# Patient Record
Sex: MALE | Race: WHITE | NOT HISPANIC OR LATINO
[De-identification: names, ages, dates, MRNs, and addresses within clinical notes are randomized per-mention and may not be internally consistent; named-entity substitution may affect disease eponyms.]

---

## 2018-03-13 PROBLEM — Z00.00 ENCOUNTER FOR PREVENTIVE HEALTH EXAMINATION: Status: ACTIVE | Noted: 2018-03-13

## 2018-08-29 ENCOUNTER — APPOINTMENT (OUTPATIENT)
Dept: SURGERY | Facility: CLINIC | Age: 63
End: 2018-08-29
Payer: COMMERCIAL

## 2018-08-29 VITALS
OXYGEN SATURATION: 100 % | WEIGHT: 190 LBS | SYSTOLIC BLOOD PRESSURE: 156 MMHG | BODY MASS INDEX: 27.2 KG/M2 | RESPIRATION RATE: 15 BRPM | TEMPERATURE: 97.3 F | HEART RATE: 65 BPM | DIASTOLIC BLOOD PRESSURE: 91 MMHG | HEIGHT: 70 IN

## 2018-08-29 DIAGNOSIS — Z87.19 OTHER SPECIFIED POSTPROCEDURAL STATES: ICD-10-CM

## 2018-08-29 DIAGNOSIS — I10 ESSENTIAL (PRIMARY) HYPERTENSION: ICD-10-CM

## 2018-08-29 DIAGNOSIS — Z78.9 OTHER SPECIFIED HEALTH STATUS: ICD-10-CM

## 2018-08-29 DIAGNOSIS — K60.3 ANAL FISTULA: ICD-10-CM

## 2018-08-29 DIAGNOSIS — Z86.010 PERSONAL HISTORY OF COLONIC POLYPS: ICD-10-CM

## 2018-08-29 DIAGNOSIS — H40.9 UNSPECIFIED GLAUCOMA: ICD-10-CM

## 2018-08-29 DIAGNOSIS — Z98.890 OTHER SPECIFIED POSTPROCEDURAL STATES: ICD-10-CM

## 2018-08-29 DIAGNOSIS — K40.20 BILATERAL INGUINAL HERNIA, W/OUT OBSTRUCTION OR GANGRENE, NOT SPECIFIED AS RECURRENT: ICD-10-CM

## 2018-08-29 DIAGNOSIS — F41.9 ANXIETY DISORDER, UNSPECIFIED: ICD-10-CM

## 2018-08-29 DIAGNOSIS — Z80.0 FAMILY HISTORY OF MALIGNANT NEOPLASM OF DIGESTIVE ORGANS: ICD-10-CM

## 2018-08-29 DIAGNOSIS — M48.9 SPONDYLOPATHY, UNSPECIFIED: ICD-10-CM

## 2018-08-29 DIAGNOSIS — K52.9 NONINFECTIVE GASTROENTERITIS AND COLITIS, UNSPECIFIED: ICD-10-CM

## 2018-08-29 DIAGNOSIS — N40.0 BENIGN PROSTATIC HYPERPLASIA WITHOUT LOWER URINARY TRACT SYMPMS: ICD-10-CM

## 2018-08-29 PROCEDURE — 99214 OFFICE O/P EST MOD 30 MIN: CPT

## 2018-08-29 RX ORDER — AMOXICILLIN AND CLAVULANATE POTASSIUM 875; 125 MG/1; MG/1
875-125 TABLET, COATED ORAL
Refills: 0 | Status: DISCONTINUED | COMMUNITY
End: 2018-08-29

## 2018-10-15 ENCOUNTER — RESULT REVIEW (OUTPATIENT)
Age: 63
End: 2018-10-15

## 2018-10-15 ENCOUNTER — OUTPATIENT (OUTPATIENT)
Dept: OUTPATIENT SERVICES | Facility: HOSPITAL | Age: 63
LOS: 1 days | End: 2018-10-15
Payer: COMMERCIAL

## 2018-10-15 ENCOUNTER — APPOINTMENT (OUTPATIENT)
Dept: SURGERY | Facility: HOSPITAL | Age: 63
End: 2018-10-15
Payer: COMMERCIAL

## 2018-10-15 DIAGNOSIS — K52.9 NONINFECTIVE GASTROENTERITIS AND COLITIS, UNSPECIFIED: ICD-10-CM

## 2018-10-15 PROCEDURE — 88305 TISSUE EXAM BY PATHOLOGIST: CPT

## 2018-10-15 PROCEDURE — 45380 COLONOSCOPY AND BIOPSY: CPT

## 2018-10-15 PROCEDURE — 88305 TISSUE EXAM BY PATHOLOGIST: CPT | Mod: 26

## 2018-10-16 LAB — SURGICAL PATHOLOGY STUDY: SIGNIFICANT CHANGE UP

## 2023-10-30 DIAGNOSIS — Z86.010 PERSONAL HISTORY OF COLONIC POLYPS: ICD-10-CM

## 2023-10-31 DIAGNOSIS — Z12.11 ENCOUNTER FOR SCREENING FOR MALIGNANT NEOPLASM OF COLON: ICD-10-CM

## 2023-12-04 ENCOUNTER — INPATIENT (INPATIENT)
Facility: HOSPITAL | Age: 68
LOS: 1 days | Discharge: HOME | DRG: 871 | End: 2023-12-06
Attending: INTERNAL MEDICINE | Admitting: INTERNAL MEDICINE
Payer: COMMERCIAL

## 2023-12-04 ENCOUNTER — RESULT REVIEW (OUTPATIENT)
Age: 68
End: 2023-12-04

## 2023-12-04 ENCOUNTER — TRANSCRIPTION ENCOUNTER (OUTPATIENT)
Age: 68
End: 2023-12-04

## 2023-12-04 ENCOUNTER — APPOINTMENT (OUTPATIENT)
Dept: SURGERY | Facility: HOSPITAL | Age: 68
End: 2023-12-04
Payer: COMMERCIAL

## 2023-12-04 VITALS
HEART RATE: 76 BPM | RESPIRATION RATE: 18 BRPM | WEIGHT: 179.9 LBS | DIASTOLIC BLOOD PRESSURE: 77 MMHG | HEIGHT: 70 IN | SYSTOLIC BLOOD PRESSURE: 137 MMHG | TEMPERATURE: 98 F | OXYGEN SATURATION: 93 %

## 2023-12-04 DIAGNOSIS — Z86.010 PERSONAL HISTORY OF COLONIC POLYPS: ICD-10-CM

## 2023-12-04 DIAGNOSIS — J69.0 PNEUMONITIS DUE TO INHALATION OF FOOD AND VOMIT: ICD-10-CM

## 2023-12-04 DIAGNOSIS — J18.9 PNEUMONIA, UNSPECIFIED ORGANISM: ICD-10-CM

## 2023-12-04 DIAGNOSIS — I10 ESSENTIAL (PRIMARY) HYPERTENSION: ICD-10-CM

## 2023-12-04 DIAGNOSIS — Z98.890 OTHER SPECIFIED POSTPROCEDURAL STATES: Chronic | ICD-10-CM

## 2023-12-04 LAB
ALBUMIN SERPL ELPH-MCNC: 4 G/DL — SIGNIFICANT CHANGE UP (ref 3.3–5)
ALBUMIN SERPL ELPH-MCNC: 4 G/DL — SIGNIFICANT CHANGE UP (ref 3.3–5)
ALP SERPL-CCNC: 50 U/L — SIGNIFICANT CHANGE UP (ref 40–120)
ALP SERPL-CCNC: 50 U/L — SIGNIFICANT CHANGE UP (ref 40–120)
ALT FLD-CCNC: 37 U/L — SIGNIFICANT CHANGE UP (ref 10–45)
ALT FLD-CCNC: 37 U/L — SIGNIFICANT CHANGE UP (ref 10–45)
ANION GAP SERPL CALC-SCNC: 11 MMOL/L — SIGNIFICANT CHANGE UP (ref 5–17)
ANION GAP SERPL CALC-SCNC: 11 MMOL/L — SIGNIFICANT CHANGE UP (ref 5–17)
AST SERPL-CCNC: 28 U/L — SIGNIFICANT CHANGE UP (ref 10–40)
AST SERPL-CCNC: 28 U/L — SIGNIFICANT CHANGE UP (ref 10–40)
BILIRUB SERPL-MCNC: 0.5 MG/DL — SIGNIFICANT CHANGE UP (ref 0.2–1.2)
BILIRUB SERPL-MCNC: 0.5 MG/DL — SIGNIFICANT CHANGE UP (ref 0.2–1.2)
BUN SERPL-MCNC: 14 MG/DL — SIGNIFICANT CHANGE UP (ref 7–23)
BUN SERPL-MCNC: 14 MG/DL — SIGNIFICANT CHANGE UP (ref 7–23)
CALCIUM SERPL-MCNC: 10.1 MG/DL — SIGNIFICANT CHANGE UP (ref 8.4–10.5)
CALCIUM SERPL-MCNC: 10.1 MG/DL — SIGNIFICANT CHANGE UP (ref 8.4–10.5)
CHLORIDE SERPL-SCNC: 104 MMOL/L — SIGNIFICANT CHANGE UP (ref 96–108)
CHLORIDE SERPL-SCNC: 104 MMOL/L — SIGNIFICANT CHANGE UP (ref 96–108)
CO2 SERPL-SCNC: 26 MMOL/L — SIGNIFICANT CHANGE UP (ref 22–31)
CO2 SERPL-SCNC: 26 MMOL/L — SIGNIFICANT CHANGE UP (ref 22–31)
CREAT SERPL-MCNC: 0.85 MG/DL — SIGNIFICANT CHANGE UP (ref 0.5–1.3)
CREAT SERPL-MCNC: 0.85 MG/DL — SIGNIFICANT CHANGE UP (ref 0.5–1.3)
EGFR: 95 ML/MIN/1.73M2 — SIGNIFICANT CHANGE UP
EGFR: 95 ML/MIN/1.73M2 — SIGNIFICANT CHANGE UP
GLUCOSE SERPL-MCNC: 123 MG/DL — HIGH (ref 70–99)
GLUCOSE SERPL-MCNC: 123 MG/DL — HIGH (ref 70–99)
HCT VFR BLD CALC: 43.4 % — SIGNIFICANT CHANGE UP (ref 39–50)
HCT VFR BLD CALC: 43.4 % — SIGNIFICANT CHANGE UP (ref 39–50)
HGB BLD-MCNC: 14.5 G/DL — SIGNIFICANT CHANGE UP (ref 13–17)
HGB BLD-MCNC: 14.5 G/DL — SIGNIFICANT CHANGE UP (ref 13–17)
MCHC RBC-ENTMCNC: 30 PG — SIGNIFICANT CHANGE UP (ref 27–34)
MCHC RBC-ENTMCNC: 30 PG — SIGNIFICANT CHANGE UP (ref 27–34)
MCHC RBC-ENTMCNC: 33.4 GM/DL — SIGNIFICANT CHANGE UP (ref 32–36)
MCHC RBC-ENTMCNC: 33.4 GM/DL — SIGNIFICANT CHANGE UP (ref 32–36)
MCV RBC AUTO: 89.7 FL — SIGNIFICANT CHANGE UP (ref 80–100)
MCV RBC AUTO: 89.7 FL — SIGNIFICANT CHANGE UP (ref 80–100)
NRBC # BLD: 0 /100 WBCS — SIGNIFICANT CHANGE UP (ref 0–0)
NRBC # BLD: 0 /100 WBCS — SIGNIFICANT CHANGE UP (ref 0–0)
PLATELET # BLD AUTO: 219 K/UL — SIGNIFICANT CHANGE UP (ref 150–400)
PLATELET # BLD AUTO: 219 K/UL — SIGNIFICANT CHANGE UP (ref 150–400)
POTASSIUM SERPL-MCNC: 3.9 MMOL/L — SIGNIFICANT CHANGE UP (ref 3.5–5.3)
POTASSIUM SERPL-MCNC: 3.9 MMOL/L — SIGNIFICANT CHANGE UP (ref 3.5–5.3)
POTASSIUM SERPL-SCNC: 3.9 MMOL/L — SIGNIFICANT CHANGE UP (ref 3.5–5.3)
POTASSIUM SERPL-SCNC: 3.9 MMOL/L — SIGNIFICANT CHANGE UP (ref 3.5–5.3)
PROT SERPL-MCNC: 7 G/DL — SIGNIFICANT CHANGE UP (ref 6–8.3)
PROT SERPL-MCNC: 7 G/DL — SIGNIFICANT CHANGE UP (ref 6–8.3)
RBC # BLD: 4.84 M/UL — SIGNIFICANT CHANGE UP (ref 4.2–5.8)
RBC # BLD: 4.84 M/UL — SIGNIFICANT CHANGE UP (ref 4.2–5.8)
RBC # FLD: 12.4 % — SIGNIFICANT CHANGE UP (ref 10.3–14.5)
RBC # FLD: 12.4 % — SIGNIFICANT CHANGE UP (ref 10.3–14.5)
SODIUM SERPL-SCNC: 141 MMOL/L — SIGNIFICANT CHANGE UP (ref 135–145)
SODIUM SERPL-SCNC: 141 MMOL/L — SIGNIFICANT CHANGE UP (ref 135–145)
WBC # BLD: 14.54 K/UL — HIGH (ref 3.8–10.5)
WBC # BLD: 14.54 K/UL — HIGH (ref 3.8–10.5)
WBC # FLD AUTO: 14.54 K/UL — HIGH (ref 3.8–10.5)
WBC # FLD AUTO: 14.54 K/UL — HIGH (ref 3.8–10.5)

## 2023-12-04 PROCEDURE — 71045 X-RAY EXAM CHEST 1 VIEW: CPT | Mod: 26

## 2023-12-04 PROCEDURE — 88305 TISSUE EXAM BY PATHOLOGIST: CPT | Mod: 26

## 2023-12-04 PROCEDURE — 45385 COLONOSCOPY W/LESION REMOVAL: CPT

## 2023-12-04 RX ORDER — SODIUM CHLORIDE 9 MG/ML
500 INJECTION INTRAMUSCULAR; INTRAVENOUS; SUBCUTANEOUS
Refills: 0 | Status: COMPLETED | OUTPATIENT
Start: 2023-12-04 | End: 2023-12-04

## 2023-12-04 RX ORDER — PANTOPRAZOLE SODIUM 20 MG/1
40 TABLET, DELAYED RELEASE ORAL ONCE
Refills: 0 | Status: COMPLETED | OUTPATIENT
Start: 2023-12-04 | End: 2023-12-04

## 2023-12-04 RX ORDER — PIPERACILLIN AND TAZOBACTAM 4; .5 G/20ML; G/20ML
3.38 INJECTION, POWDER, LYOPHILIZED, FOR SOLUTION INTRAVENOUS ONCE
Refills: 0 | Status: COMPLETED | OUTPATIENT
Start: 2023-12-05 | End: 2023-12-05

## 2023-12-04 RX ORDER — AMLODIPINE BESYLATE 2.5 MG/1
5 TABLET ORAL DAILY
Refills: 0 | Status: DISCONTINUED | OUTPATIENT
Start: 2023-12-04 | End: 2023-12-06

## 2023-12-04 RX ORDER — PIPERACILLIN AND TAZOBACTAM 4; .5 G/20ML; G/20ML
3.38 INJECTION, POWDER, LYOPHILIZED, FOR SOLUTION INTRAVENOUS ONCE
Refills: 0 | Status: COMPLETED | OUTPATIENT
Start: 2023-12-04 | End: 2023-12-04

## 2023-12-04 RX ORDER — PIPERACILLIN AND TAZOBACTAM 4; .5 G/20ML; G/20ML
3.38 INJECTION, POWDER, LYOPHILIZED, FOR SOLUTION INTRAVENOUS EVERY 8 HOURS
Refills: 0 | Status: DISCONTINUED | OUTPATIENT
Start: 2023-12-05 | End: 2023-12-06

## 2023-12-04 RX ORDER — PANTOPRAZOLE SODIUM 20 MG/1
40 TABLET, DELAYED RELEASE ORAL
Refills: 0 | Status: DISCONTINUED | OUTPATIENT
Start: 2023-12-04 | End: 2023-12-06

## 2023-12-04 RX ADMIN — PIPERACILLIN AND TAZOBACTAM 25 GRAM(S): 4; .5 INJECTION, POWDER, LYOPHILIZED, FOR SOLUTION INTRAVENOUS at 17:52

## 2023-12-04 RX ADMIN — SODIUM CHLORIDE 30 MILLILITER(S): 9 INJECTION INTRAMUSCULAR; INTRAVENOUS; SUBCUTANEOUS at 08:59

## 2023-12-04 RX ADMIN — PIPERACILLIN AND TAZOBACTAM 200 GRAM(S): 4; .5 INJECTION, POWDER, LYOPHILIZED, FOR SOLUTION INTRAVENOUS at 16:52

## 2023-12-04 RX ADMIN — PANTOPRAZOLE SODIUM 40 MILLIGRAM(S): 20 TABLET, DELAYED RELEASE ORAL at 13:58

## 2023-12-04 RX ADMIN — PANTOPRAZOLE SODIUM 40 MILLIGRAM(S): 20 TABLET, DELAYED RELEASE ORAL at 17:52

## 2023-12-04 NOTE — ASU DISCHARGE PLAN (ADULT/PEDIATRIC) - NS MD DC FALL RISK RISK
For information on Fall & Injury Prevention, visit: [email or web address] OR  [email or web address] OR  https://www.cdc.gov/steadi/patient.html For information on Fall & Injury Prevention, visit: [email or web address]://[email or web address] OR  [email or web address]://[email or web address]/[email or web address]-injury OR  https://www.cdc.gov/steadi/patient.html

## 2023-12-04 NOTE — PRE PROCEDURE NOTE - PRE PROCEDURE EVALUATION
Attending Physician:           Jerome                 Procedure: colonoscopy    Indication for Procedure: screening  ________________________________________________________  PAST MEDICAL & SURGICAL HISTORY:  Hypertension      H/O hernia repair        ALLERGIES:  No Known Allergies    HOME MEDICATIONS:  amLODIPine 5 mg oral tablet: 1 tab(s) orally once a day    AICD/PPM: [ ] yes   [ ] no    PERTINENT LAB DATA:                      PHYSICAL EXAMINATION:    Height (cm): 177.8  Weight (kg): 81.6  BMI (kg/m2): 25.8  BSA (m2): 2T(C): 36.8  HR: 76  BP: 137/77  RR: 18  SpO2: 93%    Constitutional: NAD  HEENT: PERRLA, EOMI,    Neck:  No JVD  Respiratory: CTAB/L  Cardiovascular: S1 and S2  Gastrointestinal: BS+, soft, NT/ND  Extremities: No peripheral edema  Neurological: A/O x 3, no focal deficits  Psychiatric: Normal mood, normal affect  Skin: No rashes    ASA Class: I [ ]  II [ ]  III [ ]  IV [ ]    COMMENTS:    The patient is a suitable candidate for the planned procedure unless box checked [ ]  No, explain:

## 2023-12-04 NOTE — H&P ADULT - NSICDXFAMILYHX_GEN_ALL_CORE_FT
FAMILY HISTORY:  Father  Still living? Unknown  Family hx of colon cancer, Age at diagnosis: Age Unknown

## 2023-12-04 NOTE — PATIENT PROFILE ADULT - FALL HARM RISK - HARM RISK INTERVENTIONS
Assistance OOB with selected safe patient handling equipment/Communicate Risk of Fall with Harm to all staff/Discuss with provider need for PT consult/Monitor gait and stability/Provide patient with walking aids - walker, cane, crutches/Reinforce activity limits and safety measures with patient and family/Tailored Fall Risk Interventions/Visual Cue: Yellow wristband and red socks/Bed in lowest position, wheels locked, appropriate side rails in place/Call bell, personal items and telephone in reach/Instruct patient to call for assistance before getting out of bed or chair/Non-slip footwear when patient is out of bed/[place] to call system/Physically safe environment - no spills, clutter or unnecessary equipment/Purposeful Proactive Rounding/Room/bathroom lighting operational, light cord in reach Assistance OOB with selected safe patient handling equipment/Communicate Risk of Fall with Harm to all staff/Discuss with provider need for PT consult/Monitor gait and stability/Provide patient with walking aids - walker, cane, crutches/Reinforce activity limits and safety measures with patient and family/Tailored Fall Risk Interventions/Visual Cue: Yellow wristband and red socks/Bed in lowest position, wheels locked, appropriate side rails in place/Call bell, personal items and telephone in reach/Instruct patient to call for assistance before getting out of bed or chair/Non-slip footwear when patient is out of bed/[place] to call system/Physically safe environment - no spills, clutter or unnecessary equipment/Purposeful Proactive Rounding/Room/bathroom lighting operational, light cord in reach

## 2023-12-04 NOTE — H&P ADULT - PROBLEM SELECTOR PLAN 1
with heart rate > 90 and WBC 14K source pneumonia  continue O2 as needed  piperacillin/tazobactam empiric  blood cultures testing  Tylenol for fever  clears

## 2023-12-04 NOTE — H&P ADULT - NSHPREVIEWOFSYSTEMS_GEN_ALL_CORE
Gen: no loss of wt no loss of appetite  ENT: no dizziness no hearing loss  Ophth: no blurring of vision no loss of vision  Resp: No cough no sputum production  CVS: No chest pain no palpitations no orthopnea  GI: see above HPI   : no dysuria, hematuria  Endo: no polyuria no excessive sweating  Neuro: no weakness no paresthesias  Heme: No petechiae no easy bruising  Msk: No joint pain no swelling  Skin: No rash no itching

## 2023-12-04 NOTE — ASU DISCHARGE PLAN (ADULT/PEDIATRIC) - FOLLOW UP APPOINTMENTS
[facility], [facility], [facility]

## 2023-12-04 NOTE — CHART NOTE - NSCHARTNOTEFT_GEN_A_CORE
after anesthesia, patient has been coughing, shivering, and had one desat episode to 89% briefly requiring o2  c/o cough and feeling "unwell", denies ab pain  afebrile, tachy low 100s, SBP wnl, o2 sat 91-95% on room air  abdominal soft nontender nondistended  CXR L lung possible aspiration  concern for aspiration pneumonitis vs developing pna  not concerned for abdominal issues at this time    appreciate hospitalist Dr. Jad Esparza for evaluating patient  plan to admit to Dr. Esparza medicine service for closer monitoring, pna rx with abx, ppi, +/- CT chest  no acute surgical intervention at this time    discussed with Dr. Jerome [facility]  [id number] in [facility] endo suite, patient has been coughing, shivering, and had one desat episode to 89% briefly requiring o2  c/o cough and feeling "unwell", denies ab pain  afebrile, tachy low 100s, SBP wnl, o2 sat 91-95% on room air  abdominal soft nontender nondistended  CXR L lung possible aspiration (pending final read)  concern for aspiration pneumonitis vs developing pna i/s/o patient was left lateral decubitus for colonoscopy  not concerned for abdominal issues at this time    appreciate hospitalist Dr. Jad Esparza for evaluating patient  plan to admit to Dr. Esparza medicine service for closer monitoring, pna rx with abx, ppi, +/- CT chest  no acute surgical intervention at this time    discussed with Dr. Jeroem [facility]GERY  [id number] in [facility] endo suite, patient has been coughing, shivering, and had one desat episode to 89% briefly requiring o2  c/o cough and feeling "unwell", denies ab pain  afebrile, tachy low 100s, SBP wnl, o2 sat 91-95% on room air  abdominal soft nontender nondistended  CXR L lung possible aspiration (pending final read)  concern for aspiration pneumonitis vs developing pna i/s/o patient was left lateral decubitus for colonoscopy  not concerned for abdominal issues at this time    appreciate hospitalist Dr. Jad Esparza for evaluating patient  plan to admit to Dr. Esparza medicine service for closer monitoring, pna rx with abx, ppi, +/- CT chest  no acute surgical intervention at this time    discussed with Dr. Jerome [facility]  [id number]

## 2023-12-04 NOTE — PACU DISCHARGE NOTE - HYDRATION STATUS:
[facility] Emergency Department    [address]     [place] [address]    Phone:  [phone]    Fax:  Maria Isabel Nolan [address]   MRN: [id number]    Department:  [facility] Emergency Department   Date of Visit:  4/10/20 Medication List      START taking these medications     ondansetron 4 MG Tbdp   Quantity:  10 tablet   Commonly known as:  ZOFRAN-ODT   Take 1 tablet (4 mg total) by mouth every 6 (six) hours as needed for Nausea.             Where to Get Your Medi [facility] Emergency Department. Follow-up care is at the discretion of that Physician.   If you need additional assistance selecting a physician, you may call the [employer] Physician Referral and Class Registration line at [phone] [facility] [address] [address]  [place] [address]) [phone]                Additional Information       We are concerned for your overall well being:    - If you are a smoker or have smoked in the last 12 months, we encourage you to explore op Satisfactory

## 2023-12-04 NOTE — ASU DISCHARGE PLAN (ADULT/PEDIATRIC) - CARE PROVIDER_API CALL
Rommel Cano  Colon/Rectal Surgery  [address] [place] [address], [facility] [address]  [place] [address]  Phone: [phone]  Fax: [phone]  Follow Up Time: Routine   Rommel Cano  Colon/Rectal Surgery  [address] [place] [address], [facility] [address]  [place] [address]  Phone: [phone]  Fax: [phone]  Follow Up Time: Routine

## 2023-12-04 NOTE — H&P ADULT - HISTORY OF PRESENT ILLNESS
68 year male with PMH HTN on amlodipine seen in endoscopy suite after having and episode od hypoxemia following colonoscopy . The patient was stabilized with O2 and is now saturating > 95%. The patient c/o chills and has witnessed episodes of NBNB emesis following the procedure. The patient was admitted with a clinical presentation consistent with likely aspiration pneumonia. No fever documented at the time of my exam. Denied any complaints prior to colonoscopy

## 2023-12-04 NOTE — ASU PATIENT PROFILE, ADULT - FALL HARM RISK - UNIVERSAL INTERVENTIONS
No Bed in lowest position, wheels locked, appropriate side rails in place/Call bell, personal items and telephone in reach/Instruct patient to call for assistance before getting out of bed or chair/Non-slip footwear when patient is out of bed/[place] to call system/Physically safe environment - no spills, clutter or unnecessary equipment/Purposeful Proactive Rounding/Room/bathroom lighting operational, light cord in reach Bed in lowest position, wheels locked, appropriate side rails in place/Call bell, personal items and telephone in reach/Instruct patient to call for assistance before getting out of bed or chair/Non-slip footwear when patient is out of bed/[place] to call system/Physically safe environment - no spills, clutter or unnecessary equipment/Purposeful Proactive Rounding/Room/bathroom lighting operational, light cord in reach

## 2023-12-04 NOTE — H&P ADULT - ASSESSMENT
68 year male with PMH HTN on amlodipine seen in endoscopy suite after having and episode od hypoxemia following colonoscopy clinical presentation consistent with sepsis secondary to  pneumonia

## 2023-12-04 NOTE — H&P ADULT - NSHPLABSRESULTS_GEN_ALL_CORE
noted on [place], including lab results and radiology studies   CXR positive for likely pneumonia left lung noted on [place], including lab results and radiology studies   CXR positive for likely pneumonia left lung

## 2023-12-04 NOTE — H&P ADULT - NSHPPHYSICALEXAM_GEN_ALL_CORE
PHYSICAL EXAM: vital signs noted on [place]  in no apparent distress  HEENT: MARIO ALBERTO EOMI  Neck: Supple, no JVD, no thyromegaly  Lungs: no wheeze, no crackles  CVS: S1 S2 no M/R/G  Abdomen: no tenderness, no organomegaly, BS present  Neuro: AO x 3 no focal weakness, no sensory abnormalities  Psych: appropriate affect  Skin: warm, dry  Ext: no cyanosis or clubbing, no edema  Msk: no joint swelling or deformities  Back: no CVA tenderness, no kyphosis/scoliosis

## 2023-12-04 NOTE — CHART NOTE - NSCHARTNOTEFT_GEN_A_CORE
Post Operative Note  Patient: TORIBIO ECHOLS 68y (1955) Male   MRN: [id number]  Location: [address]  Visit: 12-04-23 Inpatient  Date: 12-04-23 @ 18:26    Procedure: S/P colonoscopy    Subjective: Patient seen and examined post procedure in endoscopy suite. Patient reports that he is feeling "fine".  Denies nausea, vomiting, fever, chills, chest pain, SOB, cough.      Objective:  Vitals: T(F): 99.9 (12-04-23 @ 16:05), Max: 99.9 (12-04-23 @ 16:05)  HR: 101 (12-04-23 @ 16:05)  BP: 126/69 (12-04-23 @ 16:05) (118/71 - 148/75)  RR: 22 (12-04-23 @ 16:05)  SpO2: 92% (12-04-23 @ 16:05)  Vent Settings:       Physical Examination:  General: NAD, resting comfortably in bed  HEENT: Normocephalic atraumatic  Respiratory: Nonlabored respirations, normal CW expansion.  Cardio: S1S2, regular rate and rhythm.  Abdomen: non-distended, soft  Vascular: extremities are warm and well perfused.     Imaging:  Chest X-Ray: There is wedge-shaped opacity left base may represent atelectasis or   	infiltrate.    Assessment:  68yMale patient S/P colonoscopy for sigmoid colitis. Patient experienced coughing and desaturation event to 89% requiring supplemental O2. Chest X-ray concerning for pneumonia vs aspiration pneumonitis. He is now well appearing but still requires supplemental O2 via NC to maintain saturation.     Plan:  - Continuous pulse ox  - Restart home amlodipine  - CLD  - Morning labs  - Rest of care per primary team    Date/Time: 12-04-23 @ 18:26 Post Operative Note  Patient: TORIBIO ECHOLS 68y (1955) Male   MRN: [id number]  Location: [address]  Visit: 12-04-23 Inpatient  Date: 12-04-23 @ 18:26    Procedure: S/P colonoscopy    Subjective: Patient seen and examined post procedure in endoscopy suite. Patient reports that he is feeling "fine".  Denies nausea, vomiting, fever, chills, chest pain, SOB, cough.      Objective:  Vitals: T(F): 99.9 (12-04-23 @ 16:05), Max: 99.9 (12-04-23 @ 16:05)  HR: 101 (12-04-23 @ 16:05)  BP: 126/69 (12-04-23 @ 16:05) (118/71 - 148/75)  RR: 22 (12-04-23 @ 16:05)  SpO2: 92% (12-04-23 @ 16:05)  Vent Settings:       Physical Examination:  General: NAD, resting comfortably in bed  HEENT: Normocephalic atraumatic  Respiratory: Nonlabored respirations, normal CW expansion.  Cardio: S1S2, regular rate and rhythm.  Abdomen: non-distended, soft  Vascular: extremities are warm and well perfused.     Imaging:  Chest X-Ray: There is wedge-shaped opacity left base may represent atelectasis or   	infiltrate.    Assessment:  68yMale patient S/P colonoscopy for sigmoid colitis. Patient experienced coughing and desaturation event to 89% requiring supplemental O2. Chest X-ray concerning for pneumonia vs aspiration pneumonitis. He is now well appearing but still requires supplemental O2 via NC to maintain saturation.     Plan:  - Continuous pulse ox  - Restart home amlodipine  - CLD  - Morning labs  - Rest of care per primary team    Date/Time: 12-04-23 @ 18:26

## 2023-12-04 NOTE — PATIENT PROFILE ADULT - NSTRANSFERBELONGINGSRESP_GEN_A_NUR
Patient care assumed. Report received from day YARITZA [place]. Patient is alert and calm, resting in bed. Call light and bedside table within reach. Will continue to monitor.   yes

## 2023-12-04 NOTE — PACU DISCHARGE NOTE - COMMENTS
Patient okay for discharge to floor on 2L NC.   Patient found to be coughing after procedure with desaturation to low 90's, high 80's,. Patient reports not feeling well.   Chest X-ray concerning for left lower lobe pneumonia. Patient progressed to coughing fits leading to emesis and purulent sputum production.   Decision made to call Medicine Team consult for admission to hospital.

## 2023-12-05 LAB
A1C WITH ESTIMATED AVERAGE GLUCOSE RESULT: 5.3 % — SIGNIFICANT CHANGE UP (ref 4–5.6)
A1C WITH ESTIMATED AVERAGE GLUCOSE RESULT: 5.3 % — SIGNIFICANT CHANGE UP (ref 4–5.6)
ANION GAP SERPL CALC-SCNC: 10 MMOL/L — SIGNIFICANT CHANGE UP (ref 5–17)
ANION GAP SERPL CALC-SCNC: 10 MMOL/L — SIGNIFICANT CHANGE UP (ref 5–17)
BUN SERPL-MCNC: 16 MG/DL — SIGNIFICANT CHANGE UP (ref 7–23)
BUN SERPL-MCNC: 16 MG/DL — SIGNIFICANT CHANGE UP (ref 7–23)
CALCIUM SERPL-MCNC: 10 MG/DL — SIGNIFICANT CHANGE UP (ref 8.4–10.5)
CALCIUM SERPL-MCNC: 10 MG/DL — SIGNIFICANT CHANGE UP (ref 8.4–10.5)
CHLORIDE SERPL-SCNC: 102 MMOL/L — SIGNIFICANT CHANGE UP (ref 96–108)
CHLORIDE SERPL-SCNC: 102 MMOL/L — SIGNIFICANT CHANGE UP (ref 96–108)
CO2 SERPL-SCNC: 25 MMOL/L — SIGNIFICANT CHANGE UP (ref 22–31)
CO2 SERPL-SCNC: 25 MMOL/L — SIGNIFICANT CHANGE UP (ref 22–31)
CREAT SERPL-MCNC: 0.9 MG/DL — SIGNIFICANT CHANGE UP (ref 0.5–1.3)
CREAT SERPL-MCNC: 0.9 MG/DL — SIGNIFICANT CHANGE UP (ref 0.5–1.3)
EGFR: 94 ML/MIN/1.73M2 — SIGNIFICANT CHANGE UP
EGFR: 94 ML/MIN/1.73M2 — SIGNIFICANT CHANGE UP
ESTIMATED AVERAGE GLUCOSE: 105 MG/DL — SIGNIFICANT CHANGE UP (ref 68–114)
ESTIMATED AVERAGE GLUCOSE: 105 MG/DL — SIGNIFICANT CHANGE UP (ref 68–114)
GLUCOSE SERPL-MCNC: 114 MG/DL — HIGH (ref 70–99)
GLUCOSE SERPL-MCNC: 114 MG/DL — HIGH (ref 70–99)
HCT VFR BLD CALC: 40.7 % — SIGNIFICANT CHANGE UP (ref 39–50)
HCT VFR BLD CALC: 40.7 % — SIGNIFICANT CHANGE UP (ref 39–50)
HCV AB S/CO SERPL IA: 0.26 S/CO — SIGNIFICANT CHANGE UP (ref 0–0.99)
HCV AB S/CO SERPL IA: 0.26 S/CO — SIGNIFICANT CHANGE UP (ref 0–0.99)
HCV AB SERPL-IMP: SIGNIFICANT CHANGE UP
HCV AB SERPL-IMP: SIGNIFICANT CHANGE UP
HGB BLD-MCNC: 13.7 G/DL — SIGNIFICANT CHANGE UP (ref 13–17)
HGB BLD-MCNC: 13.7 G/DL — SIGNIFICANT CHANGE UP (ref 13–17)
MCHC RBC-ENTMCNC: 30.3 PG — SIGNIFICANT CHANGE UP (ref 27–34)
MCHC RBC-ENTMCNC: 30.3 PG — SIGNIFICANT CHANGE UP (ref 27–34)
MCHC RBC-ENTMCNC: 33.7 GM/DL — SIGNIFICANT CHANGE UP (ref 32–36)
MCHC RBC-ENTMCNC: 33.7 GM/DL — SIGNIFICANT CHANGE UP (ref 32–36)
MCV RBC AUTO: 90 FL — SIGNIFICANT CHANGE UP (ref 80–100)
MCV RBC AUTO: 90 FL — SIGNIFICANT CHANGE UP (ref 80–100)
NRBC # BLD: 0 /100 WBCS — SIGNIFICANT CHANGE UP (ref 0–0)
NRBC # BLD: 0 /100 WBCS — SIGNIFICANT CHANGE UP (ref 0–0)
PLATELET # BLD AUTO: 211 K/UL — SIGNIFICANT CHANGE UP (ref 150–400)
PLATELET # BLD AUTO: 211 K/UL — SIGNIFICANT CHANGE UP (ref 150–400)
POTASSIUM SERPL-MCNC: 3.9 MMOL/L — SIGNIFICANT CHANGE UP (ref 3.5–5.3)
POTASSIUM SERPL-MCNC: 3.9 MMOL/L — SIGNIFICANT CHANGE UP (ref 3.5–5.3)
POTASSIUM SERPL-SCNC: 3.9 MMOL/L — SIGNIFICANT CHANGE UP (ref 3.5–5.3)
POTASSIUM SERPL-SCNC: 3.9 MMOL/L — SIGNIFICANT CHANGE UP (ref 3.5–5.3)
RBC # BLD: 4.52 M/UL — SIGNIFICANT CHANGE UP (ref 4.2–5.8)
RBC # BLD: 4.52 M/UL — SIGNIFICANT CHANGE UP (ref 4.2–5.8)
RBC # FLD: 12.7 % — SIGNIFICANT CHANGE UP (ref 10.3–14.5)
RBC # FLD: 12.7 % — SIGNIFICANT CHANGE UP (ref 10.3–14.5)
SODIUM SERPL-SCNC: 137 MMOL/L — SIGNIFICANT CHANGE UP (ref 135–145)
SODIUM SERPL-SCNC: 137 MMOL/L — SIGNIFICANT CHANGE UP (ref 135–145)
TSH SERPL-MCNC: 0.24 UIU/ML — LOW (ref 0.27–4.2)
TSH SERPL-MCNC: 0.24 UIU/ML — LOW (ref 0.27–4.2)
WBC # BLD: 16.37 K/UL — HIGH (ref 3.8–10.5)
WBC # BLD: 16.37 K/UL — HIGH (ref 3.8–10.5)
WBC # FLD AUTO: 16.37 K/UL — HIGH (ref 3.8–10.5)
WBC # FLD AUTO: 16.37 K/UL — HIGH (ref 3.8–10.5)

## 2023-12-05 PROCEDURE — 71250 CT THORAX DX C-: CPT | Mod: 26

## 2023-12-05 RX ADMIN — PANTOPRAZOLE SODIUM 40 MILLIGRAM(S): 20 TABLET, DELAYED RELEASE ORAL at 05:21

## 2023-12-05 RX ADMIN — PIPERACILLIN AND TAZOBACTAM 25 GRAM(S): 4; .5 INJECTION, POWDER, LYOPHILIZED, FOR SOLUTION INTRAVENOUS at 09:11

## 2023-12-05 RX ADMIN — AMLODIPINE BESYLATE 5 MILLIGRAM(S): 2.5 TABLET ORAL at 05:22

## 2023-12-05 RX ADMIN — PIPERACILLIN AND TAZOBACTAM 25 GRAM(S): 4; .5 INJECTION, POWDER, LYOPHILIZED, FOR SOLUTION INTRAVENOUS at 02:19

## 2023-12-05 RX ADMIN — PIPERACILLIN AND TAZOBACTAM 25 GRAM(S): 4; .5 INJECTION, POWDER, LYOPHILIZED, FOR SOLUTION INTRAVENOUS at 21:33

## 2023-12-05 RX ADMIN — PANTOPRAZOLE SODIUM 40 MILLIGRAM(S): 20 TABLET, DELAYED RELEASE ORAL at 17:30

## 2023-12-05 NOTE — PROGRESS NOTE ADULT - SUBJECTIVE AND OBJECTIVE BOX
Patient is a 68y old  Male who presents with a chief complaint of chills, nausea, vomiting after colonoscopy (04 Dec 2023 17:24)      DATE OF SERVICE: 12-05-23 @ 14:29    SUBJECTIVE / OVERNIGHT EVENTS: overnight events noted  wife at bedside  "I feel much better"     ROS:  Resp: occasionally cough no sputum production  CVS: No chest pain no palpitations no orthopnea  GI: no N/V/D  : no dysuria, no hematuria  Neuro: no weakness no paresthesias  Heme: No petechiae no easy bruising  Msk: No joint pain no swelling  Skin: No rash no itching        MEDICATIONS  (STANDING):  amLODIPine   Tablet 5 milliGRAM(s) Oral daily  pantoprazole  Injectable 40 milliGRAM(s) IV Push two times a day  piperacillin/tazobactam IVPB.. 3.375 Gram(s) IV Intermittent every 8 hours    MEDICATIONS  (PRN):        CAPILLARY BLOOD GLUCOSE        I&O's Summary      Vital Signs Last 24 Hrs  T(C): 36.7 (05 Dec 2023 13:20), Max: 37.7 (04 Dec 2023 16:05)  T(F): 98.1 (05 Dec 2023 13:20), Max: 99.9 (04 Dec 2023 16:05)  HR: 74 (05 Dec 2023 13:20) (66 - 108)  BP: 112/70 (05 Dec 2023 13:20) (107/67 - 148/75)  BP(mean): --  RR: 18 (05 Dec 2023 13:20) (18 - 22)  SpO2: 93% (05 Dec 2023 13:20) (91% - 95%)    PHYSICAL EXAM:  GENERAL: in no apparent distress  HEAD:  Atraumatic, Normocephalic  EYES: EOMI, PERRLA, sclera clear  NECK: Supple, No JVD  CHEST/LUNG: decreased breath sounds bases   HEART: S1 S2; no murmurs appreciated  ABDOMEN: Soft, Nontender, Bowel sounds present  EXTREMITIES:  No clubbing or cyanosis,  no edema  NEUROLOGY: AO x 3 non-focal  SKIN: No rashes or lesions    LABS:                        13.7   16.37 )-----------( 211      ( 05 Dec 2023 07:13 )             40.7     12-05    137  |  102  |  16  ----------------------------<  114<H>  3.9   |  25  |  0.90    Ca    10.0      05 Dec 2023 07:13    TPro  7.0  /  Alb  4.0  /  TBili  0.5  /  DBili  x   /  AST  28  /  ALT  37  /  AlkPhos  50  12-04          Urinalysis Basic - ( 05 Dec 2023 07:13 )    Color: x / Appearance: x / SG: x / pH: x  Gluc: 114 mg/dL / Ketone: x  / Bili: x / Urobili: x   Blood: x / Protein: x / Nitrite: x   Leuk Esterase: x / RBC: x / WBC x   Sq Epi: x / Non Sq Epi: x / Bacteria: x          All consultant(s) notes reviewed and care discussed with other providers        Contact Number, Dr Esparza [phone] Patient is a 68y old  Male who presents with a chief complaint of chills, nausea, vomiting after colonoscopy (04 Dec 2023 17:24)      DATE OF SERVICE: 12-05-23 @ 14:29    SUBJECTIVE / OVERNIGHT EVENTS: overnight events noted  wife at bedside  "I feel much better"     ROS:  Resp: occasionally cough no sputum production  CVS: No chest pain no palpitations no orthopnea  GI: no N/V/D  : no dysuria, no hematuria  Neuro: no weakness no paresthesias  Heme: No petechiae no easy bruising  Msk: No joint pain no swelling  Skin: No rash no itching        MEDICATIONS  (STANDING):  amLODIPine   Tablet 5 milliGRAM(s) Oral daily  pantoprazole  Injectable 40 milliGRAM(s) IV Push two times a day  piperacillin/tazobactam IVPB.. 3.375 Gram(s) IV Intermittent every 8 hours    MEDICATIONS  (PRN):        CAPILLARY BLOOD GLUCOSE        I&O's Summary      Vital Signs Last 24 Hrs  T(C): 36.7 (05 Dec 2023 13:20), Max: 37.7 (04 Dec 2023 16:05)  T(F): 98.1 (05 Dec 2023 13:20), Max: 99.9 (04 Dec 2023 16:05)  HR: 74 (05 Dec 2023 13:20) (66 - 108)  BP: 112/70 (05 Dec 2023 13:20) (107/67 - 148/75)  BP(mean): --  RR: 18 (05 Dec 2023 13:20) (18 - 22)  SpO2: 93% (05 Dec 2023 13:20) (91% - 95%)    PHYSICAL EXAM:  GENERAL: in no apparent distress  HEAD:  Atraumatic, Normocephalic  EYES: EOMI, PERRLA, sclera clear  NECK: Supple, No JVD  CHEST/LUNG: decreased breath sounds bases   HEART: S1 S2; no murmurs appreciated  ABDOMEN: Soft, Nontender, Bowel sounds present  EXTREMITIES:  No clubbing or cyanosis,  no edema  NEUROLOGY: AO x 3 non-focal  SKIN: No rashes or lesions    LABS:                        13.7   16.37 )-----------( 211      ( 05 Dec 2023 07:13 )             40.7     12-05    137  |  102  |  16  ----------------------------<  114<H>  3.9   |  25  |  0.90    Ca    10.0      05 Dec 2023 07:13    TPro  7.0  /  Alb  4.0  /  TBili  0.5  /  DBili  x   /  AST  28  /  ALT  37  /  AlkPhos  50  12-04          Urinalysis Basic - ( 05 Dec 2023 07:13 )    Color: x / Appearance: x / SG: x / pH: x  Gluc: 114 mg/dL / Ketone: x  / Bili: x / Urobili: x   Blood: x / Protein: x / Nitrite: x   Leuk Esterase: x / RBC: x / WBC x   Sq Epi: x / Non Sq Epi: x / Bacteria: x          All consultant(s) notes reviewed and care discussed with other providers        Contact Number, Dr Esparza [phone]

## 2023-12-05 NOTE — CONSULT NOTE ADULT - ASSESSMENT
68 year male with PMH HTN on amlodipine seen in endoscopy suite after having and episode of hypoxemia following colonoscopy . The patient was stabilized with O2 and is now saturating > 95%. The patient c/o chills and has witnessed episodes of NBNB emesis following the procedure. The patient was admitted with a clinical presentation consistent with likely aspiration pneumonia. No fever documented at the time of my exam. Denied any complaints prior to colonoscopy  (04 Dec 2023 17:24)  today he feels much better:  he has no underlying lung disease and has no hx of smoking   above history noted:    Pneumonia:   HTN      Pneumonia:   -still low grade fever:  wbc increased: likely vomiting induced aspiration:   -CT chest non contrast    HTN  -controlled:    dvt prophylaxis    nicole pmd : nicole wife

## 2023-12-05 NOTE — CONSULT NOTE ADULT - SUBJECTIVE AND OBJECTIVE BOX
12-05-23 @ 16:53    Patient is a 68y old  Male who presents with a chief complaint of chills, nausea, vomiting after colonoscopy (05 Dec 2023 14:28)      HPI:  68 year male with PMH HTN on amlodipine seen in endoscopy suite after having and episode od hypoxemia following colonoscopy . The patient was stabilized with O2 and is now saturating > 95%. The patient c/o chills and has witnessed episodes of NBNB emesis following the procedure. The patient was admitted with a clinical presentation consistent with likely aspiration pneumonia. No fever documented at the time of my exam. Denied any complaints prior to colonoscopy  (04 Dec 2023 17:24)  today he feels much better:  he has no underlying lung disease and has no hx of smoking   above history noted  ?FOLLOWING PRESENT  [x ] Hx of PE/DVT, [ x] Hx COPD, [ x] Hx of Asthma, [ x] Hx of Hospitalization, [ x]  Hx of BiPAP/CPAP use, [x ] Hx of MAYRA    Allergies    No Known Allergies    Intolerances        PAST MEDICAL & SURGICAL HISTORY:  Hypertension      H/O hernia repair          FAMILY HISTORY:  Family hx of colon cancer (Father)        Social History: [ x ] TOBACCO                  [ x ] ETOH                                 [ x ] IVDA/DRUGS    REVIEW OF SYSTEMS      General:	x    Skin/Breast:x  	  Ophthalmologic:x  	  ENMT:	x    Respiratory and Thorax: mild cough   	  Cardiovascular:	x    Gastrointestinal:	nausea . vomiting    Genitourinary:	x    Musculoskeletal:	x    Neurological:	x    Psychiatric:	x    Hematology/Lymphatics:	x    Endocrine:	x    Allergic/Immunologic:	x    MEDICATIONS  (STANDING):  amLODIPine   Tablet 5 milliGRAM(s) Oral daily  pantoprazole  Injectable 40 milliGRAM(s) IV Push two times a day  piperacillin/tazobactam IVPB.. 3.375 Gram(s) IV Intermittent every 8 hours    MEDICATIONS  (PRN):       Vital Signs Last 24 Hrs  T(C): 36.7 (05 Dec 2023 13:20), Max: 37.7 (04 Dec 2023 18:37)  T(F): 98.1 (05 Dec 2023 13:20), Max: 99.9 (04 Dec 2023 18:37)  HR: 74 (05 Dec 2023 13:20) (66 - 104)  BP: 112/70 (05 Dec 2023 13:20) (107/67 - 128/74)  BP(mean): --  RR: 18 (05 Dec 2023 13:20) (18 - 20)  SpO2: 93% (05 Dec 2023 13:20) (92% - 95%)    Parameters below as of 05 Dec 2023 13:20  Patient On (Oxygen Delivery Method): room air    Orthostatic VS          I&O's Summary      Physical Exam:   GENERAL: NAD, well-groomed, well-developed  HEENT: MARIO ALBERTO/   Atraumatic, Normocephalic  ENMT: No tonsillar erythema, exudates, or enlargement; Moist mucous membranes, Good dentition, No lesions  NECK: Supple, No JVD, Normal thyroid  CHEST/LUNG: Clear to auscultation bilaterally  CVS: Regular rate and rhythm; No murmurs, rubs, or gallops  GI: : Soft, Nontender, Nondistended; Bowel sounds present  NERVOUS SYSTEM:  Alert & Oriented X3  EXTREMITIES:  2+ Peripheral Pulses, No clubbing, cyanosis, or edema  LYMPH: No lymphadenopathy noted  SKIN: No rashes or lesions  ENDOCRINOLOGY: No Thyromegaly  PSYCH: Appropriate    Labs:                              13.7   16.37 )-----------( 211      ( 05 Dec 2023 07:13 )             40.7                         14.5   14.54 )-----------( 219      ( 04 Dec 2023 14:30 )             43.4     12-05    137  |  102  |  16  ----------------------------<  114<H>  3.9   |  25  |  0.90  12-04    141  |  104  |  14  ----------------------------<  123<H>  3.9   |  26  |  0.85    Ca    10.0      05 Dec 2023 07:13  Ca    10.1      04 Dec 2023 14:30    TPro  7.0  /  Alb  4.0  /  TBili  0.5  /  DBili  x   /  AST  28  /  ALT  37  /  AlkPhos  50  12-04    CAPILLARY BLOOD GLUCOSE        LIVER FUNCTIONS - ( 04 Dec 2023 14:30 )  Alb: 4.0 g/dL / Pro: 7.0 g/dL / ALK PHOS: 50 U/L / ALT: 37 U/L / AST: 28 U/L / GGT: x             Urinalysis Basic - ( 05 Dec 2023 07:13 )    Color: x / Appearance: x / SG: x / pH: x  Gluc: 114 mg/dL / Ketone: x  / Bili: x / Urobili: x   Blood: x / Protein: x / Nitrite: x   Leuk Esterase: x / RBC: x / WBC x   Sq Epi: x / Non Sq Epi: x / Bacteria: x      D DImer      Studies  Chest X-RAY  CT SCAN Chest   CT Abdomen  Venous Dopplers: LE:   Others      rad< from: Xray Chest 1 View- PORTABLE-Urgent (Xray Chest 1 View- PORTABLE-Urgent .) (12.04.23 @ 14:22) >    ACC: [id number] EXAM:  XR CHEST PORTABLE URGENT 1V   ORDERED BY:  OVIDIO RODRÍGUEZ     PROCEDURE DATE:  12/04/2023          INTERPRETATION:  INDICATION: Coughing and desaturation    Portable chest 12:42 PM    COMPARISON: None    FINDINGS:  Heart/Vascular: The heart size, mediastinum, hilum and aorta are within   normal limits for projection.  Pulmonary: Midline trachea. There is wedge-shaped opacity left base may   represent atelectasis or infiltrate. The right lung is clear. There is no   pulmonary venous congestion.  Bones: There is no fracture.  Lines and catheter: None    Impression:    There is wedge-shaped opacity left base may represent atelectasis or   infiltrate.    --- End of Report ---             APPLE LONG DO; Attending Radiologist  This document has been electronically signed. Dec  4 2023  3:29PM    < end of copied text >                 12-05-23 @ 16:53    Patient is a 68y old  Male who presents with a chief complaint of chills, nausea, vomiting after colonoscopy (05 Dec 2023 14:28)      HPI:  68 year male with PMH HTN on amlodipine seen in endoscopy suite after having and episode od hypoxemia following colonoscopy . The patient was stabilized with O2 and is now saturating > 95%. The patient c/o chills and has witnessed episodes of NBNB emesis following the procedure. The patient was admitted with a clinical presentation consistent with likely aspiration pneumonia. No fever documented at the time of my exam. Denied any complaints prior to colonoscopy  (04 Dec 2023 17:24)  today he feels much better:  he has no underlying lung disease and has no hx of smoking   above history noted  ?FOLLOWING PRESENT  [x ] Hx of PE/DVT, [ x] Hx COPD, [ x] Hx of Asthma, [ x] Hx of Hospitalization, [ x]  Hx of BiPAP/CPAP use, [x ] Hx of MAYRA    Allergies    No Known Allergies    Intolerances        PAST MEDICAL & SURGICAL HISTORY:  Hypertension      H/O hernia repair          FAMILY HISTORY:  Family hx of colon cancer (Father)        Social History: [ x ] TOBACCO                  [ x ] ETOH                                 [ x ] IVDA/DRUGS    REVIEW OF SYSTEMS      General:	x    Skin/Breast:x  	  Ophthalmologic:x  	  ENMT:	x    Respiratory and Thorax: mild cough   	  Cardiovascular:	x    Gastrointestinal:	nausea . vomiting    Genitourinary:	x    Musculoskeletal:	x    Neurological:	x    Psychiatric:	x    Hematology/Lymphatics:	x    Endocrine:	x    Allergic/Immunologic:	x    MEDICATIONS  (STANDING):  amLODIPine   Tablet 5 milliGRAM(s) Oral daily  pantoprazole  Injectable 40 milliGRAM(s) IV Push two times a day  piperacillin/tazobactam IVPB.. 3.375 Gram(s) IV Intermittent every 8 hours    MEDICATIONS  (PRN):       Vital Signs Last 24 Hrs  T(C): 36.7 (05 Dec 2023 13:20), Max: 37.7 (04 Dec 2023 18:37)  T(F): 98.1 (05 Dec 2023 13:20), Max: 99.9 (04 Dec 2023 18:37)  HR: 74 (05 Dec 2023 13:20) (66 - 104)  BP: 112/70 (05 Dec 2023 13:20) (107/67 - 128/74)  BP(mean): --  RR: 18 (05 Dec 2023 13:20) (18 - 20)  SpO2: 93% (05 Dec 2023 13:20) (92% - 95%)    Parameters below as of 05 Dec 2023 13:20  Patient On (Oxygen Delivery Method): room air    Orthostatic VS          I&O's Summary      Physical Exam:   GENERAL: NAD, well-groomed, well-developed  HEENT: MARIO ALBERTO/   Atraumatic, Normocephalic  ENMT: No tonsillar erythema, exudates, or enlargement; Moist mucous membranes, Good dentition, No lesions  NECK: Supple, No JVD, Normal thyroid  CHEST/LUNG: Clear to auscultation bilaterally  CVS: Regular rate and rhythm; No murmurs, rubs, or gallops  GI: : Soft, Nontender, Nondistended; Bowel sounds present  NERVOUS SYSTEM:  Alert & Oriented X3  EXTREMITIES:  2+ Peripheral Pulses, No clubbing, cyanosis, or edema  LYMPH: No lymphadenopathy noted  SKIN: No rashes or lesions  ENDOCRINOLOGY: No Thyromegaly  PSYCH: Appropriate    Labs:                              13.7   16.37 )-----------( 211      ( 05 Dec 2023 07:13 )             40.7                         14.5   14.54 )-----------( 219      ( 04 Dec 2023 14:30 )             43.4     12-05    137  |  102  |  16  ----------------------------<  114<H>  3.9   |  25  |  0.90  12-04    141  |  104  |  14  ----------------------------<  123<H>  3.9   |  26  |  0.85    Ca    10.0      05 Dec 2023 07:13  Ca    10.1      04 Dec 2023 14:30    TPro  7.0  /  Alb  4.0  /  TBili  0.5  /  DBili  x   /  AST  28  /  ALT  37  /  AlkPhos  50  12-04    CAPILLARY BLOOD GLUCOSE        LIVER FUNCTIONS - ( 04 Dec 2023 14:30 )  Alb: 4.0 g/dL / Pro: 7.0 g/dL / ALK PHOS: 50 U/L / ALT: 37 U/L / AST: 28 U/L / GGT: x             Urinalysis Basic - ( 05 Dec 2023 07:13 )    Color: x / Appearance: x / SG: x / pH: x  Gluc: 114 mg/dL / Ketone: x  / Bili: x / Urobili: x   Blood: x / Protein: x / Nitrite: x   Leuk Esterase: x / RBC: x / WBC x   Sq Epi: x / Non Sq Epi: x / Bacteria: x      D DImer      Studies  Chest X-RAY  CT SCAN Chest   CT Abdomen  Venous Dopplers: LE:   Others      rad< from: Xray Chest 1 View- PORTABLE-Urgent (Xray Chest 1 View- PORTABLE-Urgent .) (12.04.23 @ 14:22) >    ACC: [id number] EXAM:  XR CHEST PORTABLE URGENT 1V   ORDERED BY:  OVIDIO RODRÍGUEZ     PROCEDURE DATE:  12/04/2023          INTERPRETATION:  INDICATION: Coughing and desaturation    Portable chest 12:42 PM    COMPARISON: None    FINDINGS:  Heart/Vascular: The heart size, mediastinum, hilum and aorta are within   normal limits for projection.  Pulmonary: Midline trachea. There is wedge-shaped opacity left base may   represent atelectasis or infiltrate. The right lung is clear. There is no   pulmonary venous congestion.  Bones: There is no fracture.  Lines and catheter: None    Impression:    There is wedge-shaped opacity left base may represent atelectasis or   infiltrate.    --- End of Report ---             APPLE LONG DO; Attending Radiologist  This document has been electronically signed. Dec  4 2023  3:29PM    < end of copied text >

## 2023-12-05 NOTE — CONSULT NOTE ADULT - REASON FOR ADMISSION
chills, nausea, vomiting after colonoscopy What Type Of Note Output Would You Prefer (Optional)?: Standard Output What Is The Reason For Today's Visit?: Full Body Skin Examination with No Concerns

## 2023-12-06 ENCOUNTER — TRANSCRIPTION ENCOUNTER (OUTPATIENT)
Age: 68
End: 2023-12-06

## 2023-12-06 VITALS
RESPIRATION RATE: 20 BRPM | OXYGEN SATURATION: 94 % | DIASTOLIC BLOOD PRESSURE: 76 MMHG | HEART RATE: 88 BPM | TEMPERATURE: 98 F | SYSTOLIC BLOOD PRESSURE: 133 MMHG

## 2023-12-06 LAB
HCT VFR BLD CALC: 39.5 % — SIGNIFICANT CHANGE UP (ref 39–50)
HCT VFR BLD CALC: 39.5 % — SIGNIFICANT CHANGE UP (ref 39–50)
HGB BLD-MCNC: 13.4 G/DL — SIGNIFICANT CHANGE UP (ref 13–17)
HGB BLD-MCNC: 13.4 G/DL — SIGNIFICANT CHANGE UP (ref 13–17)
MCHC RBC-ENTMCNC: 30.4 PG — SIGNIFICANT CHANGE UP (ref 27–34)
MCHC RBC-ENTMCNC: 30.4 PG — SIGNIFICANT CHANGE UP (ref 27–34)
MCHC RBC-ENTMCNC: 33.9 GM/DL — SIGNIFICANT CHANGE UP (ref 32–36)
MCHC RBC-ENTMCNC: 33.9 GM/DL — SIGNIFICANT CHANGE UP (ref 32–36)
MCV RBC AUTO: 89.6 FL — SIGNIFICANT CHANGE UP (ref 80–100)
MCV RBC AUTO: 89.6 FL — SIGNIFICANT CHANGE UP (ref 80–100)
NRBC # BLD: 0 /100 WBCS — SIGNIFICANT CHANGE UP (ref 0–0)
NRBC # BLD: 0 /100 WBCS — SIGNIFICANT CHANGE UP (ref 0–0)
PLATELET # BLD AUTO: 209 K/UL — SIGNIFICANT CHANGE UP (ref 150–400)
PLATELET # BLD AUTO: 209 K/UL — SIGNIFICANT CHANGE UP (ref 150–400)
RBC # BLD: 4.41 M/UL — SIGNIFICANT CHANGE UP (ref 4.2–5.8)
RBC # BLD: 4.41 M/UL — SIGNIFICANT CHANGE UP (ref 4.2–5.8)
RBC # FLD: 12.4 % — SIGNIFICANT CHANGE UP (ref 10.3–14.5)
RBC # FLD: 12.4 % — SIGNIFICANT CHANGE UP (ref 10.3–14.5)
WBC # BLD: 15.95 K/UL — HIGH (ref 3.8–10.5)
WBC # BLD: 15.95 K/UL — HIGH (ref 3.8–10.5)
WBC # FLD AUTO: 15.95 K/UL — HIGH (ref 3.8–10.5)
WBC # FLD AUTO: 15.95 K/UL — HIGH (ref 3.8–10.5)

## 2023-12-06 PROCEDURE — 36415 COLL VENOUS BLD VENIPUNCTURE: CPT

## 2023-12-06 PROCEDURE — 85027 COMPLETE CBC AUTOMATED: CPT

## 2023-12-06 PROCEDURE — 83036 HEMOGLOBIN GLYCOSYLATED A1C: CPT

## 2023-12-06 PROCEDURE — 88305 TISSUE EXAM BY PATHOLOGIST: CPT

## 2023-12-06 PROCEDURE — 80053 COMPREHEN METABOLIC PANEL: CPT

## 2023-12-06 PROCEDURE — 71045 X-RAY EXAM CHEST 1 VIEW: CPT

## 2023-12-06 PROCEDURE — 86803 HEPATITIS C AB TEST: CPT

## 2023-12-06 PROCEDURE — 94640 AIRWAY INHALATION TREATMENT: CPT

## 2023-12-06 PROCEDURE — 87040 BLOOD CULTURE FOR BACTERIA: CPT

## 2023-12-06 PROCEDURE — 71250 CT THORAX DX C-: CPT

## 2023-12-06 PROCEDURE — 80048 BASIC METABOLIC PNL TOTAL CA: CPT

## 2023-12-06 PROCEDURE — 84443 ASSAY THYROID STIM HORMONE: CPT

## 2023-12-06 RX ORDER — ALBUTEROL 90 UG/1
2 AEROSOL, METERED ORAL
Qty: 1 | Refills: 0
Start: 2023-12-06 | End: 2024-01-04

## 2023-12-06 RX ORDER — GUAIFENESIN/DEXTROMETHORPHAN 600MG-30MG
10 TABLET, EXTENDED RELEASE 12 HR ORAL EVERY 6 HOURS
Refills: 0 | Status: DISCONTINUED | OUTPATIENT
Start: 2023-12-06 | End: 2023-12-06

## 2023-12-06 RX ORDER — GUAIFENESIN/DEXTROMETHORPHAN 600MG-30MG
10 TABLET, EXTENDED RELEASE 12 HR ORAL
Qty: 0 | Refills: 0 | DISCHARGE
Start: 2023-12-06

## 2023-12-06 RX ORDER — IPRATROPIUM/ALBUTEROL SULFATE 18-103MCG
3 AEROSOL WITH ADAPTER (GRAM) INHALATION EVERY 6 HOURS
Refills: 0 | Status: DISCONTINUED | OUTPATIENT
Start: 2023-12-06 | End: 2023-12-06

## 2023-12-06 RX ADMIN — AMLODIPINE BESYLATE 5 MILLIGRAM(S): 2.5 TABLET ORAL at 06:17

## 2023-12-06 RX ADMIN — PANTOPRAZOLE SODIUM 40 MILLIGRAM(S): 20 TABLET, DELAYED RELEASE ORAL at 06:17

## 2023-12-06 RX ADMIN — PIPERACILLIN AND TAZOBACTAM 25 GRAM(S): 4; .5 INJECTION, POWDER, LYOPHILIZED, FOR SOLUTION INTRAVENOUS at 13:52

## 2023-12-06 RX ADMIN — PIPERACILLIN AND TAZOBACTAM 25 GRAM(S): 4; .5 INJECTION, POWDER, LYOPHILIZED, FOR SOLUTION INTRAVENOUS at 06:17

## 2023-12-06 RX ADMIN — Medication 3 MILLILITER(S): at 13:52

## 2023-12-06 NOTE — PROGRESS NOTE ADULT - SUBJECTIVE AND OBJECTIVE BOX
Patient is a 68y old  Male who presents with a chief complaint of chills, nausea, vomiting after colonoscopy (05 Dec 2023 16:53)      DATE OF SERVICE: 12-06-23 @ 14:01    SUBJECTIVE / OVERNIGHT EVENTS: overnight events noted    ROS:  Resp: + cough no sputum production  CVS: No chest pain no palpitations no orthopnea  GI: no N/V/D    MEDICATIONS  (STANDING):  amLODIPine   Tablet 5 milliGRAM(s) Oral daily  guaifenesin/dextromethorphan Oral Liquid 10 milliLiter(s) Oral every 6 hours  pantoprazole  Injectable 40 milliGRAM(s) IV Push two times a day  piperacillin/tazobactam IVPB.. 3.375 Gram(s) IV Intermittent every 8 hours    MEDICATIONS  (PRN):  albuterol/ipratropium for Nebulization 3 milliLiter(s) Nebulizer every 6 hours PRN Shortness of Breath and/or Wheezing        CAPILLARY BLOOD GLUCOSE        I&O's Summary      Vital Signs Last 24 Hrs  T(C): 36.6 (06 Dec 2023 11:54), Max: 37.3 (05 Dec 2023 23:53)  T(F): 97.8 (06 Dec 2023 11:54), Max: 99.1 (05 Dec 2023 23:53)  HR: 80 (06 Dec 2023 11:54) (74 - 87)  BP: 130/83 (06 Dec 2023 11:54) (117/63 - 130/83)  BP(mean): --  RR: 18 (06 Dec 2023 11:54) (18 - 18)  SpO2: 93% (06 Dec 2023 11:54) (91% - 93%)    PHYSICAL EXAM:  EYES: EOMI, PERRLA, sclera clear  NECK: Supple, No JVD  CHEST/LUNG: minimal wheezing LLL  HEART: S1 S2; no murmurs   ABDOMEN: Soft, Nontender  EXTREMITIES:  no edema  NEUROLOGY: AO x 3 non-focal  SKIN: No rashes or lesions    LABS:                        13.4   15.95 )-----------( 209      ( 06 Dec 2023 06:57 )             39.5     12-05    137  |  102  |  16  ----------------------------<  114<H>  3.9   |  25  |  0.90    Ca    10.0      05 Dec 2023 07:13    TPro  7.0  /  Alb  4.0  /  TBili  0.5  /  DBili  x   /  AST  28  /  ALT  37  /  AlkPhos  50  12-04          Urinalysis Basic - ( 05 Dec 2023 07:13 )    Color: x / Appearance: x / SG: x / pH: x  Gluc: 114 mg/dL / Ketone: x  / Bili: x / Urobili: x   Blood: x / Protein: x / Nitrite: x   Leuk Esterase: x / RBC: x / WBC x   Sq Epi: x / Non Sq Epi: x / Bacteria: x          All consultant(s) notes reviewed and care discussed with other providers        Contact Number, Dr Esparza [phone] Patient is a 68y old  Male who presents with a chief complaint of chills, nausea, vomiting after colonoscopy (05 Dec 2023 16:53)      DATE OF SERVICE: 12-06-23 @ 14:01    SUBJECTIVE / OVERNIGHT EVENTS: overnight events noted    ROS:  Resp: + cough no sputum production  CVS: No chest pain no palpitations no orthopnea  GI: no N/V/D    MEDICATIONS  (STANDING):  amLODIPine   Tablet 5 milliGRAM(s) Oral daily  guaifenesin/dextromethorphan Oral Liquid 10 milliLiter(s) Oral every 6 hours  pantoprazole  Injectable 40 milliGRAM(s) IV Push two times a day  piperacillin/tazobactam IVPB.. 3.375 Gram(s) IV Intermittent every 8 hours    MEDICATIONS  (PRN):  albuterol/ipratropium for Nebulization 3 milliLiter(s) Nebulizer every 6 hours PRN Shortness of Breath and/or Wheezing        CAPILLARY BLOOD GLUCOSE        I&O's Summary      Vital Signs Last 24 Hrs  T(C): 36.6 (06 Dec 2023 11:54), Max: 37.3 (05 Dec 2023 23:53)  T(F): 97.8 (06 Dec 2023 11:54), Max: 99.1 (05 Dec 2023 23:53)  HR: 80 (06 Dec 2023 11:54) (74 - 87)  BP: 130/83 (06 Dec 2023 11:54) (117/63 - 130/83)  BP(mean): --  RR: 18 (06 Dec 2023 11:54) (18 - 18)  SpO2: 93% (06 Dec 2023 11:54) (91% - 93%)    PHYSICAL EXAM:  EYES: EOMI, PERRLA, sclera clear  NECK: Supple, No JVD  CHEST/LUNG: minimal wheezing LLL  HEART: S1 S2; no murmurs   ABDOMEN: Soft, Nontender  EXTREMITIES:  no edema  NEUROLOGY: AO x 3 non-focal  SKIN: No rashes or lesions    LABS:                        13.4   15.95 )-----------( 209      ( 06 Dec 2023 06:57 )             39.5     12-05    137  |  102  |  16  ----------------------------<  114<H>  3.9   |  25  |  0.90    Ca    10.0      05 Dec 2023 07:13    TPro  7.0  /  Alb  4.0  /  TBili  0.5  /  DBili  x   /  AST  28  /  ALT  37  /  AlkPhos  50  12-04          Urinalysis Basic - ( 05 Dec 2023 07:13 )    Color: x / Appearance: x / SG: x / pH: x  Gluc: 114 mg/dL / Ketone: x  / Bili: x / Urobili: x   Blood: x / Protein: x / Nitrite: x   Leuk Esterase: x / RBC: x / WBC x   Sq Epi: x / Non Sq Epi: x / Bacteria: x          All consultant(s) notes reviewed and care discussed with other providers        Contact Number, Dr Esparza [phone]

## 2023-12-06 NOTE — PROGRESS NOTE ADULT - PROVIDER SPECIALTY LIST ADULT
Pulmonology
Alert-The patient is alert, awake and responds to voice. The patient is oriented to time, place, and person. The triage nurse is able to obtain subjective information.
Internal Medicine
Internal Medicine

## 2023-12-06 NOTE — PROGRESS NOTE ADULT - PROBLEM SELECTOR PLAN 1
sepsis secondary to aspiration pneumonia with heart rate > 90 and WBC 14K source   off O2  continue piperacillin/tazobactam   WBC up to 16K  awaiting blood culture results  Tylenol for fever  diet advanced to regular
sepsis secondary to aspiration pneumonia with heart rate > 90 and WBC 14K source   now resolved  pulmonary follow up appreciated  CT positive for LLL pneumonia no abscess  likely discharge home Augmentin 5 more days  +/_ inhaler   defer to pulmonary

## 2023-12-06 NOTE — PROGRESS NOTE ADULT - SUBJECTIVE AND OBJECTIVE BOX
Date of Service: 12-06-23 @ 16:14    Patient is a 68y old  Male who presents with a chief complaint of chills, nausea, vomiting after colonoscopy (06 Dec 2023 14:06)      Any change in ROS: Doingp retty good: no sob:  no cough     MEDICATIONS  (STANDING):  amLODIPine   Tablet 5 milliGRAM(s) Oral daily  guaifenesin/dextromethorphan Oral Liquid 10 milliLiter(s) Oral every 6 hours  pantoprazole  Injectable 40 milliGRAM(s) IV Push two times a day  piperacillin/tazobactam IVPB.. 3.375 Gram(s) IV Intermittent every 8 hours    MEDICATIONS  (PRN):  albuterol/ipratropium for Nebulization 3 milliLiter(s) Nebulizer every 6 hours PRN Shortness of Breath and/or Wheezing    Vital Signs Last 24 Hrs  T(C): 36.6 (06 Dec 2023 11:54), Max: 37.3 (05 Dec 2023 23:53)  T(F): 97.8 (06 Dec 2023 11:54), Max: 99.1 (05 Dec 2023 23:53)  HR: 80 (06 Dec 2023 11:54) (74 - 87)  BP: 130/83 (06 Dec 2023 11:54) (117/63 - 130/83)  BP(mean): --  RR: 18 (06 Dec 2023 11:54) (18 - 18)  SpO2: 93% (06 Dec 2023 11:54) (91% - 93%)    Parameters below as of 06 Dec 2023 11:54  Patient On (Oxygen Delivery Method): room air        I&O's Summary        Physical Exam:   GENERAL: NAD, well-groomed, well-developed  HEENT: MARIO LABERTO/   Atraumatic, Normocephalic  ENMT: No tonsillar erythema, exudates, or enlargement; Moist mucous membranes, Good dentition, No lesions  NECK: Supple, No JVD, Normal thyroid  CHEST/LUNG: Clrackles left base  CVS: Regular rate and rhythm; No murmurs, rubs, or gallops  GI: : Soft, Nontender, Nondistended; Bowel sounds present  NERVOUS SYSTEM:  Alert & Oriented X3  EXTREMITIES:  2+ Peripheral Pulses, No clubbing, cyanosis, or edema  LYMPH: No lymphadenopathy noted  SKIN: No rashes or lesions  ENDOCRINOLOGY: No Thyromegaly  PSYCH: Appropriate    Labs:                              13.4   15.95 )-----------( 209      ( 06 Dec 2023 06:57 )             39.5                         13.7   16.37 )-----------( 211      ( 05 Dec 2023 07:13 )             40.7                         14.5   14.54 )-----------( 219      ( 04 Dec 2023 14:30 )             43.4     12-05    137  |  102  |  16  ----------------------------<  114<H>  3.9   |  25  |  0.90  12-04    141  |  104  |  14  ----------------------------<  123<H>  3.9   |  26  |  0.85    Ca    10.0      05 Dec 2023 07:13    TPro  7.0  /  Alb  4.0  /  TBili  0.5  /  DBili  x   /  AST  28  /  ALT  37  /  AlkPhos  50  12-04    CAPILLARY BLOOD GLUCOSE              Urinalysis Basic - ( 05 Dec 2023 07:13 )    Color: x / Appearance: x / SG: x / pH: x  Gluc: 114 mg/dL / Ketone: x  / Bili: x / Urobili: x   Blood: x / Protein: x / Nitrite: x   Leuk Esterase: x / RBC: x / WBC x   Sq Epi: x / Non Sq Epi: x / Bacteria: x            RECENT CULTURES:  12-04 @ 16:25 .Blood Blood-Peripheral       < from: CT Chest No Cont (12.05.23 @ 23:21) >    INTERPRETATION:  HISTORY: Admitting Dxs: J18.9 PNEUMONIA, UNSPECIFIED   ORGANISM    EXAMINATION: CT CHEST was performed without IV contrast.    COMPARISON: No prior CT chest comparison.    FINDINGS:    AIRWAYS, LUNGS, PLEURA: Consolidation ground-glass opacification within   the left lower lobe. Clustered micronodular opacities in the left upper   lobe multifocally. Patchy groundglass opacity in the right lower lobe   superior segment. Trace left pleural effusion. Mucoid impacted lingular   airways.    MEDIASTINUM: Normal heart size. Coronary calcifications. No pericardial   effusion. Dilated ascending thoracic aorta measures 4.1 cm.  Mildly   enlarged left paratracheal node measures 1 cm in short axis, likely   reactive.    IMAGED ABDOMEN: Subcentimeter hepatic hypodensities, likely cysts.    SOFT TISSUES: Unremarkable.    BONES: Unremarkable.      IMPRESSION:.    Left lower lobe pneumonia.    CT chest follow-up in 3 months recommended to ensure clearing.    --- End of Report ---             NICK FLANAGAN MD; Attending Radiologist  This document has been electronically signed. Dec  6 2023  5:31AM    < end of copied text >           No growth at 24 hours    12-04 @ 16:20 .Blood Blood-Peripheral                No growth at 24 hours          RESPIRATORY CULTURES:          Studies  Chest X-RAY  CT SCAN Chest   Venous Dopplers: LE:   CT Abdomen  Others               Date of Service: 12-06-23 @ 16:14    Patient is a 68y old  Male who presents with a chief complaint of chills, nausea, vomiting after colonoscopy (06 Dec 2023 14:06)      Any change in ROS: Doingp retty good: no sob:  no cough     MEDICATIONS  (STANDING):  amLODIPine   Tablet 5 milliGRAM(s) Oral daily  guaifenesin/dextromethorphan Oral Liquid 10 milliLiter(s) Oral every 6 hours  pantoprazole  Injectable 40 milliGRAM(s) IV Push two times a day  piperacillin/tazobactam IVPB.. 3.375 Gram(s) IV Intermittent every 8 hours    MEDICATIONS  (PRN):  albuterol/ipratropium for Nebulization 3 milliLiter(s) Nebulizer every 6 hours PRN Shortness of Breath and/or Wheezing    Vital Signs Last 24 Hrs  T(C): 36.6 (06 Dec 2023 11:54), Max: 37.3 (05 Dec 2023 23:53)  T(F): 97.8 (06 Dec 2023 11:54), Max: 99.1 (05 Dec 2023 23:53)  HR: 80 (06 Dec 2023 11:54) (74 - 87)  BP: 130/83 (06 Dec 2023 11:54) (117/63 - 130/83)  BP(mean): --  RR: 18 (06 Dec 2023 11:54) (18 - 18)  SpO2: 93% (06 Dec 2023 11:54) (91% - 93%)    Parameters below as of 06 Dec 2023 11:54  Patient On (Oxygen Delivery Method): room air        I&O's Summary        Physical Exam:   GENERAL: NAD, well-groomed, well-developed  HEENT: MARIO ALBERTO/   Atraumatic, Normocephalic  ENMT: No tonsillar erythema, exudates, or enlargement; Moist mucous membranes, Good dentition, No lesions  NECK: Supple, No JVD, Normal thyroid  CHEST/LUNG: Clrackles left base  CVS: Regular rate and rhythm; No murmurs, rubs, or gallops  GI: : Soft, Nontender, Nondistended; Bowel sounds present  NERVOUS SYSTEM:  Alert & Oriented X3  EXTREMITIES:  2+ Peripheral Pulses, No clubbing, cyanosis, or edema  LYMPH: No lymphadenopathy noted  SKIN: No rashes or lesions  ENDOCRINOLOGY: No Thyromegaly  PSYCH: Appropriate    Labs:                              13.4   15.95 )-----------( 209      ( 06 Dec 2023 06:57 )             39.5                         13.7   16.37 )-----------( 211      ( 05 Dec 2023 07:13 )             40.7                         14.5   14.54 )-----------( 219      ( 04 Dec 2023 14:30 )             43.4     12-05    137  |  102  |  16  ----------------------------<  114<H>  3.9   |  25  |  0.90  12-04    141  |  104  |  14  ----------------------------<  123<H>  3.9   |  26  |  0.85    Ca    10.0      05 Dec 2023 07:13    TPro  7.0  /  Alb  4.0  /  TBili  0.5  /  DBili  x   /  AST  28  /  ALT  37  /  AlkPhos  50  12-04    CAPILLARY BLOOD GLUCOSE              Urinalysis Basic - ( 05 Dec 2023 07:13 )    Color: x / Appearance: x / SG: x / pH: x  Gluc: 114 mg/dL / Ketone: x  / Bili: x / Urobili: x   Blood: x / Protein: x / Nitrite: x   Leuk Esterase: x / RBC: x / WBC x   Sq Epi: x / Non Sq Epi: x / Bacteria: x            RECENT CULTURES:  12-04 @ 16:25 .Blood Blood-Peripheral       < from: CT Chest No Cont (12.05.23 @ 23:21) >    INTERPRETATION:  HISTORY: Admitting Dxs: J18.9 PNEUMONIA, UNSPECIFIED   ORGANISM    EXAMINATION: CT CHEST was performed without IV contrast.    COMPARISON: No prior CT chest comparison.    FINDINGS:    AIRWAYS, LUNGS, PLEURA: Consolidation ground-glass opacification within   the left lower lobe. Clustered micronodular opacities in the left upper   lobe multifocally. Patchy groundglass opacity in the right lower lobe   superior segment. Trace left pleural effusion. Mucoid impacted lingular   airways.    MEDIASTINUM: Normal heart size. Coronary calcifications. No pericardial   effusion. Dilated ascending thoracic aorta measures 4.1 cm.  Mildly   enlarged left paratracheal node measures 1 cm in short axis, likely   reactive.    IMAGED ABDOMEN: Subcentimeter hepatic hypodensities, likely cysts.    SOFT TISSUES: Unremarkable.    BONES: Unremarkable.      IMPRESSION:.    Left lower lobe pneumonia.    CT chest follow-up in 3 months recommended to ensure clearing.    --- End of Report ---             NICK FLANAGAN MD; Attending Radiologist  This document has been electronically signed. Dec  6 2023  5:31AM    < end of copied text >           No growth at 24 hours    12-04 @ 16:20 .Blood Blood-Peripheral                No growth at 24 hours          RESPIRATORY CULTURES:          Studies  Chest X-RAY  CT SCAN Chest   Venous Dopplers: LE:   CT Abdomen  Others

## 2023-12-06 NOTE — DISCHARGE NOTE NURSING/CASE MANAGEMENT/SOCIAL WORK - PATIENT PORTAL LINK FT
You can access the FollowMyHealth Patient Portal offered by [facility] by registering at the following website: [email or web address]. By joining [facility]’s FollowMyHealth portal, you will also be able to view your health information using other applications (apps) compatible with our system. You can access the FollowMyHealth Patient Portal offered by [facility] by registering at the following website: [email or web address]. By joining [facility]’s FollowMyHealth portal, you will also be able to view your health information using other applications (apps) compatible with our system.

## 2023-12-06 NOTE — DISCHARGE NOTE PROVIDER - NSDCCPCAREPLAN_GEN_ALL_CORE_FT
PRINCIPAL DISCHARGE DIAGNOSIS  Diagnosis: Aspiration pneumonia  Assessment and Plan of Treatment: CT chest with Left Lower Lobe pneumonia. Received Zosyn here. Continue antibiotic as recommended. Follow up with your PCP and pulmonary. Recommended for repeat CT chest in 3 months to see the resolution.      SECONDARY DISCHARGE DIAGNOSES  Diagnosis: HTN (hypertension)  Assessment and Plan of Treatment: Low salt diet  Activity as tolerated.  Take all medication as prescribed.  Follow up with your medical doctor for routine blood pressure monitoring at your next visit.  Notify your doctor if you have any of the following symptoms:   Dizziness, Lightheadedness, Blurry vision, Headache, Chest pain, Shortness of breath      Diagnosis: Acute respiratory failure with hypoxia  Assessment and Plan of Treatment: Likely due to aspiration PNA. No need of supplemental oxygen now.

## 2023-12-06 NOTE — DISCHARGE NOTE PROVIDER - NSDCMRMEDTOKEN_GEN_ALL_CORE_FT
amLODIPine 5 mg oral tablet: 1 tab(s) orally once a day  Amlodipine 5mg/Valsartan 160mg: once a day   amLODIPine 5 mg oral tablet: 1 tab(s) orally once a day  amoxicillin-clavulanate 875 mg-125 mg oral tablet: 875 milligram(s) orally 2 times a day  guaiFENesin-dextromethorphan 100 mg-10 mg/5 mL oral liquid: 10 milliliter(s) orally every 6 hours  Ventolin HFA 90 mcg/inh inhalation aerosol: 2 puff(s) inhaled every 6 hours as needed for  shortness of breath and/or wheezing

## 2023-12-06 NOTE — DISCHARGE NOTE PROVIDER - HOSPITAL COURSE
HPI:  68 year male with PMH HTN on amlodipine seen in endoscopy suite after having and episode od hypoxemia following colonoscopy . The patient was stabilized with O2 and is now saturating > 95%. The patient c/o chills and has witnessed episodes of NBNB emesis following the procedure. The patient was admitted with a clinical presentation consistent with likely aspiration pneumonia. No fever documented at the time of my exam. Denied any complaints prior to colonoscopy  (04 Dec 2023 17:24)    Hospital Course:      Important Medication Changes and Reason:    Active or Pending Issues Requiring Follow-up:    Advanced Directives:   [ ] Full code  [ ] DNR  [ ] Hospice    Discharge Diagnoses:         HPI:  68 year male with PMH HTN on amlodipine seen in endoscopy suite after having and episode od hypoxemia following colonoscopy . The patient was stabilized with O2 and is now saturating > 95%. The patient c/o chills and has witnessed episodes of NBNB emesis following the procedure. The patient was admitted with a clinical presentation consistent with likely aspiration pneumonia. No fever documented at the time of my exam. Denied any complaints prior to colonoscopy  (04 Dec 2023 17:24)    Hospital Course:  Patient had a scheduled colonoscopy. Became hypoxic after the procedure. Required Oxygen NC, likely from aspiration from vomiting.  Started on Zosyn, CT chest with LL pna. WBC was also elevated. Now slowly trending down. Blood Cx NGTD. Pulmonary consulted.   Patient now on RA. Medically cleared for DC home. Disch/Dispo/Med rec [place]-------------. Out patient follow up with PCP and pulmonary  also need repeat CT chest in 3 months to see clearance.      Important Medication Changes and Reason:    Active or Pending Issues Requiring Follow-up:  PCP  Pulmonary    Advanced Directives:   [ x] Full code  [ ] DNR  [ ] Hospice    Discharge Diagnoses:  Aspiration PNA  Hypoxia         HPI:  68 year male with PMH HTN on amlodipine seen in endoscopy suite after having and episode od hypoxemia following colonoscopy . The patient was stabilized with O2 and is now saturating > 95%. The patient c/o chills and has witnessed episodes of NBNB emesis following the procedure. The patient was admitted with a clinical presentation consistent with likely aspiration pneumonia. No fever documented at the time of my exam. Denied any complaints prior to colonoscopy  (04 Dec 2023 17:24)    Hospital Course:  Patient had a scheduled colonoscopy. Became hypoxic after the procedure. Required Oxygen NC, likely from aspiration from vomiting.  Started on Zosyn, CT chest with LLL pna. WBC was also elevated. Now slowly trending down. Blood Cx NGTD. Pulmonary consulted.   Patient now on RA. Medically cleared for DC home. Disch/Dispo/Med rec [facility]  Out patient follow up with PCP and pulmonary  also need repeat CT chest in 3 months to see clearance.      Important Medication Changes and Reason:    Active or Pending Issues Requiring Follow-up:  PCP  Pulmonary    Advanced Directives:   [ x] Full code  [ ] DNR  [ ] Hospice    Discharge Diagnoses:  Aspiration PNA  Hypoxia

## 2023-12-06 NOTE — DISCHARGE NOTE PROVIDER - CARE PROVIDER_API CALL
Fawad Fulton  Internal Medicine  [address] [place] [address]  Phone: [phone]  Fax: [phone]  Follow Up Time:     Jaden Arrieta  Pulmonary Disease  [address] [place] [address]  Phone: [phone]  Fax: [phone]  Follow Up Time:    Fawad Fulton  Internal Medicine  [address] [place] [address]  Phone: [phone]  Fax: [phone]  Follow Up Time:     Jaden Arrieta  Pulmonary Disease  [address] [place] [address]  Phone: [phone]  Fax: [phone]  Follow Up Time:

## 2023-12-06 NOTE — DISCHARGE NOTE PROVIDER - PROVIDER TOKENS
PROVIDER:[TOKEN:[[id number]]],PROVIDER:[TOKEN:[[id number]]] PROVIDER:[TOKEN:[[id number]]],PROVIDER:[TOKEN:[[id number]]]

## 2023-12-06 NOTE — PROGRESS NOTE ADULT - ASSESSMENT
68 year male with PMH HTN on amlodipine seen in endoscopy suite after having and episode of hypoxemia following colonoscopy . The patient was stabilized with O2 and is now saturating > 95%. The patient c/o chills and has witnessed episodes of NBNB emesis following the procedure. The patient was admitted with a clinical presentation consistent with likely aspiration pneumonia. No fever documented at the time of my exam. Denied any complaints prior to colonoscopy  (04 Dec 2023 17:24)  today he feels much better:  he has no underlying lung disease and has no hx of smoking   above history noted:    Pneumonia:   HTN      Pneumonia:   -still low grade fever:  wbc increased: likely vomiting induced aspiration:   -CT chest non contrast    -c chest note:  has left lower lobe pneumonia and has cysts also ;  for dc today on o al antibtiocs:  reced duoneb in am for wheezing : can dc on ventolin prn q 6 hours   HTN  -controlled:    dvt prophylaxis    nicole pmd : nicole wife 68 year male with PMH HTN on amlodipine seen in endoscopy suite after having and episode of hypoxemia following colonoscopy . The patient was stabilized with O2 and is now saturating > 95%. The patient c/o chills and has witnessed episodes of NBNB emesis following the procedure. The patient was admitted with a clinical presentation consistent with likely aspiration pneumonia. No fever documented at the time of my exam. Denied any complaints prior to colonoscopy  (04 Dec 2023 17:24)  today he feels much better:  he has no underlying lung disease and has no hx of smoking   above history noted:    Pneumonia:   HTN      Pneumonia:   -still low grade fever:  wbc increased: likely vomiting induced aspiration:   -CT chest non contrast    -c chest note:  has left lower lobe pneumonia and has cysts also ;  for dc today on o al antibtiocs:  reced duoneb in am for wheezing : can dc on ventolin prn q 6 hours   HTN  -controlled:    dvt prophylaxis    nicole pmd : nicloe wife

## 2023-12-06 NOTE — PROGRESS NOTE ADULT - REASON FOR ADMISSION
chills, nausea, vomiting after colonoscopy

## 2023-12-06 NOTE — DISCHARGE NOTE NURSING/CASE MANAGEMENT/SOCIAL WORK - NSDCPEFALRISK_GEN_ALL_CORE
For information on Fall & Injury Prevention, visit: [email or web address]/[email or web address]-[email or web address] OR  [email or web address]://www.[facility]/[email or web address]-tips-to-avoid-injury OR  https://www.cdc.gov/steadi/patient.html For information on Fall & Injury Prevention, visit: [email or web address]://[email or web address]/[email or web address]-[email or web address]-mobility OR  https://www.[facility]/[email or web address]-prevention-tips-to-avoid-injury OR  https://www.cdc.gov/steadi/patient.html

## 2023-12-06 NOTE — PROGRESS NOTE ADULT - NSPROGADDITIONALINFOA_GEN_ALL_CORE
discussed with patient in detail, expresses understanding of treatment plans.  discussed with covering ACP  discussed with pulmonary
discussed with patient in detail, expresses understanding of treatment plans.  discussed with patient's wife at bedside in detail   pulmonary help appreciated  likely discharge home tomorrow if OK

## 2023-12-06 NOTE — DISCHARGE NOTE NURSING/CASE MANAGEMENT/SOCIAL WORK - NSDCFUADDAPPT_GEN_ALL_CORE_FT
APPTS ARE READY TO BE MADE: [x ] YES  STAR patient. Follow up with pulmonary within 7 day from discharge.     Best Family or Patient Contact (if needed):    Additional Information about above appointments (if needed):    1:   2:   3:     Other comments or requests:

## 2023-12-06 NOTE — DISCHARGE NOTE PROVIDER - NSDCFUSCHEDAPPT_GEN_ALL_CORE_FT
Lisker, Gita N  [facility]  [address]  Scheduled Appointment: 01/18/2024     Lisker, Gita N  [facility]  [address]  Scheduled Appointment: 01/18/2024

## 2023-12-06 NOTE — DISCHARGE NOTE PROVIDER - NSDCFUADDAPPT_GEN_ALL_CORE_FT
APPTS ARE READY TO BE MADE: [x ] YES    Best Family or Patient Contact (if needed):    Additional Information about above appointments (if needed):    1:   2:   3:     Other comments or requests:    APPTS ARE READY TO BE MADE: [x ] YES  STAR patient. Follow up with pulmonary within 7 day from discharge.     Best Family or Patient Contact (if needed):    Additional Information about above appointments (if needed):    1:   2:   3:     Other comments or requests:    APPTS ARE READY TO BE MADE: [x ] YES  STAR patient. Follow up with pulmonary within 7 day from discharge.     Best Family or Patient Contact (if needed):    Additional Information about above appointments (if needed):    1:   2:   3:     Other comments or requests:       Patient was scheduled for an appointment on  1/18 9:30 am at [address] [place] [address] with Dr. Lisker. Patient/Caregiver was advised of appointment details.  APPTS ARE READY TO BE MADE: [x ] YES  STAR patient. Follow up with pulmonary within 7 day from discharge.     Best Family or Patient Contact (if needed):    Additional Information about above appointments (if needed):    1:   2:   3:     Other comments or requests:       Patient was scheduled for an appointment on  1/18 9:30 am at [address] [place] [address] with Dr. Lisker. Patient/Caregiver was advised of appointment details.

## 2023-12-09 LAB
CULTURE RESULTS: SIGNIFICANT CHANGE UP
SPECIMEN SOURCE: SIGNIFICANT CHANGE UP
SURGICAL PATHOLOGY STUDY: SIGNIFICANT CHANGE UP
SURGICAL PATHOLOGY STUDY: SIGNIFICANT CHANGE UP

## 2023-12-11 PROBLEM — I10 ESSENTIAL (PRIMARY) HYPERTENSION: Chronic | Status: ACTIVE | Noted: 2023-12-04

## 2024-01-04 ENCOUNTER — NON-APPOINTMENT (OUTPATIENT)
Age: 69
End: 2024-01-04

## 2024-01-09 ENCOUNTER — OUTPATIENT (OUTPATIENT)
Dept: OUTPATIENT SERVICES | Facility: HOSPITAL | Age: 69
LOS: 1 days | End: 2024-01-09
Payer: COMMERCIAL

## 2024-01-09 ENCOUNTER — APPOINTMENT (OUTPATIENT)
Dept: RADIOLOGY | Facility: IMAGING CENTER | Age: 69
End: 2024-01-09
Payer: COMMERCIAL

## 2024-01-09 ENCOUNTER — APPOINTMENT (OUTPATIENT)
Dept: PULMONOLOGY | Facility: CLINIC | Age: 69
End: 2024-01-09
Payer: COMMERCIAL

## 2024-01-09 VITALS
BODY MASS INDEX: 24.26 KG/M2 | HEART RATE: 92 BPM | TEMPERATURE: 98.2 F | RESPIRATION RATE: 17 BRPM | HEIGHT: 70 IN | SYSTOLIC BLOOD PRESSURE: 124 MMHG | OXYGEN SATURATION: 96 % | WEIGHT: 169.5 LBS | DIASTOLIC BLOOD PRESSURE: 84 MMHG

## 2024-01-09 DIAGNOSIS — J69.0 PNEUMONITIS DUE TO INHALATION OF FOOD AND VOMIT: ICD-10-CM

## 2024-01-09 DIAGNOSIS — D75.1 SECONDARY POLYCYTHEMIA: ICD-10-CM

## 2024-01-09 PROCEDURE — 99203 OFFICE O/P NEW LOW 30 MIN: CPT

## 2024-01-09 PROCEDURE — 71046 X-RAY EXAM CHEST 2 VIEWS: CPT | Mod: 26

## 2024-01-09 PROCEDURE — 71046 X-RAY EXAM CHEST 2 VIEWS: CPT

## 2024-01-10 LAB
ALBUMIN SERPL ELPH-MCNC: 4.2 G/DL
ALP BLD-CCNC: 57 U/L
ALT SERPL-CCNC: 46 U/L
ANION GAP SERPL CALC-SCNC: 16 MMOL/L
AST SERPL-CCNC: 23 U/L
BASOPHILS # BLD AUTO: 0.04 K/UL
BASOPHILS NFR BLD AUTO: 0.3 %
BILIRUB SERPL-MCNC: 0.3 MG/DL
BUN SERPL-MCNC: 19 MG/DL
CALCIUM SERPL-MCNC: 10.5 MG/DL
CHLORIDE SERPL-SCNC: 97 MMOL/L
CO2 SERPL-SCNC: 24 MMOL/L
CREAT SERPL-MCNC: 0.81 MG/DL
EGFR: 97 ML/MIN/1.73M2
EOSINOPHIL # BLD AUTO: 0.08 K/UL
EOSINOPHIL NFR BLD AUTO: 0.6 %
HCT VFR BLD CALC: 41.8 %
HGB BLD-MCNC: 13.5 G/DL
IMM GRANULOCYTES NFR BLD AUTO: 0.4 %
LYMPHOCYTES # BLD AUTO: 2.3 K/UL
LYMPHOCYTES NFR BLD AUTO: 17.1 %
MAN DIFF?: NORMAL
MCHC RBC-ENTMCNC: 29.2 PG
MCHC RBC-ENTMCNC: 32.3 GM/DL
MCV RBC AUTO: 90.5 FL
MONOCYTES # BLD AUTO: 0.86 K/UL
MONOCYTES NFR BLD AUTO: 6.4 %
NEUTROPHILS # BLD AUTO: 10.1 K/UL
NEUTROPHILS NFR BLD AUTO: 75.2 %
PLATELET # BLD AUTO: 424 K/UL
POTASSIUM SERPL-SCNC: 4.8 MMOL/L
PROT SERPL-MCNC: 7.7 G/DL
RAPID RVP RESULT: NOT DETECTED
RBC # BLD: 4.62 M/UL
RBC # FLD: 12.6 %
SARS-COV-2 RNA PNL RESP NAA+PROBE: NOT DETECTED
SODIUM SERPL-SCNC: 137 MMOL/L
TSH SERPL-ACNC: 1.18 UIU/ML
WBC # FLD AUTO: 13.43 K/UL

## 2024-01-11 ENCOUNTER — APPOINTMENT (OUTPATIENT)
Dept: PULMONOLOGY | Facility: CLINIC | Age: 69
End: 2024-01-11
Payer: COMMERCIAL

## 2024-01-11 PROCEDURE — 36415 COLL VENOUS BLD VENIPUNCTURE: CPT

## 2024-01-12 LAB
BASOPHILS # BLD AUTO: 0.05 K/UL
BASOPHILS NFR BLD AUTO: 0.4 %
EOSINOPHIL # BLD AUTO: 0.14 K/UL
EOSINOPHIL NFR BLD AUTO: 1.2 %
HCT VFR BLD CALC: 42.1 %
HGB BLD-MCNC: 13.4 G/DL
IMM GRANULOCYTES NFR BLD AUTO: 0.4 %
LYMPHOCYTES # BLD AUTO: 2.16 K/UL
LYMPHOCYTES NFR BLD AUTO: 19 %
MAN DIFF?: NORMAL
MCHC RBC-ENTMCNC: 29.3 PG
MCHC RBC-ENTMCNC: 31.8 GM/DL
MCV RBC AUTO: 91.9 FL
MONOCYTES # BLD AUTO: 0.56 K/UL
MONOCYTES NFR BLD AUTO: 4.9 %
NEUTROPHILS # BLD AUTO: 8.44 K/UL
NEUTROPHILS NFR BLD AUTO: 74.1 %
PLATELET # BLD AUTO: 327 K/UL
RBC # BLD: 4.58 M/UL
RBC # FLD: 12.7 %
WBC # FLD AUTO: 11.39 K/UL

## 2024-01-16 ENCOUNTER — NON-APPOINTMENT (OUTPATIENT)
Age: 69
End: 2024-01-16

## 2024-01-16 LAB — BACTERIA BLD CULT: NORMAL

## 2024-01-17 ENCOUNTER — APPOINTMENT (OUTPATIENT)
Dept: PULMONOLOGY | Facility: CLINIC | Age: 69
End: 2024-01-17

## 2024-01-17 LAB — BACTERIA BLD CULT: NORMAL

## 2024-01-18 ENCOUNTER — NON-APPOINTMENT (OUTPATIENT)
Age: 69
End: 2024-01-18

## 2024-01-18 ENCOUNTER — APPOINTMENT (OUTPATIENT)
Dept: PULMONOLOGY | Facility: CLINIC | Age: 69
End: 2024-01-18
Payer: COMMERCIAL

## 2024-01-18 DIAGNOSIS — J69.0 PNEUMONITIS DUE TO INHALATION OF FOOD AND VOMIT: ICD-10-CM

## 2024-01-18 PROCEDURE — 36415 COLL VENOUS BLD VENIPUNCTURE: CPT

## 2024-01-19 LAB
BASOPHILS # BLD AUTO: 0.03 K/UL
BASOPHILS NFR BLD AUTO: 0.4 %
EOSINOPHIL # BLD AUTO: 0.14 K/UL
EOSINOPHIL NFR BLD AUTO: 1.9 %
HCT VFR BLD CALC: 39.6 %
HGB BLD-MCNC: 12.6 G/DL
IMM GRANULOCYTES NFR BLD AUTO: 0.3 %
LYMPHOCYTES # BLD AUTO: 2.19 K/UL
LYMPHOCYTES NFR BLD AUTO: 30 %
MAN DIFF?: NORMAL
MCHC RBC-ENTMCNC: 28.8 PG
MCHC RBC-ENTMCNC: 31.8 GM/DL
MCV RBC AUTO: 90.6 FL
MONOCYTES # BLD AUTO: 0.47 K/UL
MONOCYTES NFR BLD AUTO: 6.4 %
NEUTROPHILS # BLD AUTO: 4.46 K/UL
NEUTROPHILS NFR BLD AUTO: 61 %
PLATELET # BLD AUTO: 336 K/UL
RBC # BLD: 4.37 M/UL
RBC # FLD: 12.5 %
WBC # FLD AUTO: 7.31 K/UL

## 2024-01-23 ENCOUNTER — RESULT REVIEW (OUTPATIENT)
Age: 69
End: 2024-01-23

## 2024-01-23 ENCOUNTER — APPOINTMENT (OUTPATIENT)
Dept: INFECTIOUS DISEASE | Facility: CLINIC | Age: 69
End: 2024-01-23
Payer: COMMERCIAL

## 2024-01-23 VITALS
WEIGHT: 173 LBS | BODY MASS INDEX: 24.82 KG/M2 | DIASTOLIC BLOOD PRESSURE: 91 MMHG | SYSTOLIC BLOOD PRESSURE: 150 MMHG | HEART RATE: 83 BPM | OXYGEN SATURATION: 97 % | TEMPERATURE: 97.2 F

## 2024-01-23 PROCEDURE — 99204 OFFICE O/P NEW MOD 45 MIN: CPT

## 2024-01-23 NOTE — REVIEW OF SYSTEMS
[Fever] : fever [Chills] : chills [Body Aches] : body aches [Feeling Sick] : feeling sick [Feeling Tired] : feeling tired [Negative] : Gastrointestinal

## 2024-01-23 NOTE — HISTORY OF PRESENT ILLNESS
[FreeTextEntry1] : 68 year old who developed aspiration pna after colonoscopy in December ( 12/4)  was treated  with Zosyn and Augmentin  Off ab for the last 2 weeks .  has  sweats chills temps to 99.9 hip pain  diffuse muscle pain fatigue weight loss

## 2024-01-23 NOTE — ASSESSMENT
[FreeTextEntry1] : many symptoms wt loss fatigue fever joint pain  BC negative no murmur no valvular  disease   mild anemia   would get esr crp ct of chest and abd rheumatology studies  to start/

## 2024-01-23 NOTE — PHYSICAL EXAM
[General Appearance - Alert] : alert [General Appearance - In No Acute Distress] : in no acute distress [Sclera] : the sclera and conjunctiva were normal [PERRL With Normal Accommodation] : pupils were equal in size, round, reactive to light [Extraocular Movements] : extraocular movements were intact [Outer Ear] : the ears and nose were normal in appearance [Oropharynx] : the oropharynx was normal with no thrush [Neck Appearance] : the appearance of the neck was normal [Neck Cervical Mass (___cm)] : no neck mass was observed [Jugular Venous Distention Increased] : there was no jugular-venous distention [Thyroid Diffuse Enlargement] : the thyroid was not enlarged [] : no respiratory distress [Auscultation Breath Sounds / Voice Sounds] : lungs were clear to auscultation bilaterally

## 2024-01-24 ENCOUNTER — NON-APPOINTMENT (OUTPATIENT)
Age: 69
End: 2024-01-24

## 2024-01-24 LAB
CRP SERPL-MCNC: 7 MG/L
ERYTHROCYTE [SEDIMENTATION RATE] IN BLOOD BY WESTERGREN METHOD: 28 MM/HR

## 2024-01-25 DIAGNOSIS — N39.0 URINARY TRACT INFECTION, SITE NOT SPECIFIED: ICD-10-CM

## 2024-01-30 ENCOUNTER — NON-APPOINTMENT (OUTPATIENT)
Age: 69
End: 2024-01-30

## 2024-01-31 ENCOUNTER — NON-APPOINTMENT (OUTPATIENT)
Age: 69
End: 2024-01-31

## 2024-02-02 ENCOUNTER — NON-APPOINTMENT (OUTPATIENT)
Age: 69
End: 2024-02-02

## 2024-02-08 ENCOUNTER — APPOINTMENT (OUTPATIENT)
Dept: CT IMAGING | Facility: CLINIC | Age: 69
End: 2024-02-08
Payer: COMMERCIAL

## 2024-02-08 ENCOUNTER — OUTPATIENT (OUTPATIENT)
Dept: OUTPATIENT SERVICES | Facility: HOSPITAL | Age: 69
LOS: 1 days | End: 2024-02-08
Payer: COMMERCIAL

## 2024-02-08 DIAGNOSIS — D75.1 SECONDARY POLYCYTHEMIA: ICD-10-CM

## 2024-02-08 DIAGNOSIS — Z00.8 ENCOUNTER FOR OTHER GENERAL EXAMINATION: ICD-10-CM

## 2024-02-08 DIAGNOSIS — K52.9 NONINFECTIVE GASTROENTERITIS AND COLITIS, UNSPECIFIED: ICD-10-CM

## 2024-02-08 DIAGNOSIS — Z98.890 OTHER SPECIFIED POSTPROCEDURAL STATES: Chronic | ICD-10-CM

## 2024-02-08 PROCEDURE — 71260 CT THORAX DX C+: CPT

## 2024-02-08 PROCEDURE — 71260 CT THORAX DX C+: CPT | Mod: 26

## 2024-02-08 PROCEDURE — 74177 CT ABD & PELVIS W/CONTRAST: CPT | Mod: 26

## 2024-02-08 PROCEDURE — 74177 CT ABD & PELVIS W/CONTRAST: CPT

## 2024-02-16 ENCOUNTER — APPOINTMENT (OUTPATIENT)
Dept: INTERVENTIONAL RADIOLOGY/VASCULAR | Facility: CLINIC | Age: 69
End: 2024-02-16

## 2024-02-16 DIAGNOSIS — R63.4 ABNORMAL WEIGHT LOSS: ICD-10-CM

## 2024-02-16 DIAGNOSIS — Z78.9 OTHER SPECIFIED HEALTH STATUS: ICD-10-CM

## 2024-02-16 PROCEDURE — XXXXX: CPT | Mod: 1L

## 2024-02-16 RX ORDER — VALSARTAN 160 MG/1
160 TABLET, COATED ORAL DAILY
Refills: 0 | Status: ACTIVE | COMMUNITY

## 2024-02-16 RX ORDER — ASPIRIN 81 MG/1
81 TABLET, CHEWABLE ORAL DAILY
Refills: 0 | Status: ACTIVE | COMMUNITY
Start: 2024-02-16

## 2024-02-16 RX ORDER — MULTIVITAMIN
TABLET ORAL
Refills: 0 | Status: DISCONTINUED | COMMUNITY
End: 2024-02-16

## 2024-02-16 RX ORDER — AMLODIPINE BESYLATE 5 MG/1
5 TABLET ORAL DAILY
Refills: 0 | Status: ACTIVE | COMMUNITY

## 2024-02-16 RX ORDER — SODIUM PICOSULFATE, MAGNESIUM OXIDE, AND ANHYDROUS CITRIC ACID 12; 3.5; 1 G/175ML; G/175ML; MG/175ML
10-3.5-12 MG-GM LIQUID ORAL
Qty: 1 | Refills: 0 | Status: DISCONTINUED | COMMUNITY
Start: 2023-10-31 | End: 2024-02-16

## 2024-02-16 RX ORDER — SODIUM PICOSULFATE, MAGNESIUM OXIDE, AND ANHYDROUS CITRIC ACID 10; 3.5; 12 MG/160ML; G/160ML; G/160ML
10-3.5-12 MG-GM LIQUID ORAL
Qty: 1 | Refills: 0 | Status: DISCONTINUED | COMMUNITY
Start: 2018-08-29 | End: 2024-02-16

## 2024-02-16 NOTE — ASSESSMENT
[FreeTextEntry1] : This is a 68 year old with pmhx of anxiety, HTN, glaucoma, and recent aspiration pneumonia who presents today for consultation for lymph node biopsy.    1.  Lymphadenopathy -  intermittent fevers (up to 102), night sweats (2-4 times/night), chills, and an unintentional weight loss of 15 lbs since December - 2/8/24 CT abdomen demonstrated newly enlarged left para-aortic and left pelvic sidewall lymph nodes of uncertain etiology. - imaging reviewed by Dr. Duval and I d/w patient - biopsy is requested for diagnosis and to help guide treatment options - discussed how procedure may be technically challenging due to location of lymph nodes, but every effort will be made to obtain biopsy - I reviewed image guided lymph node biopsy procedure, including the risks (infection, bleeding, etc), benefits, alternatives, and expected post procedure course. - labs will done at outpatient [facility] lab - pt takes aspirin for prophylaxis, denies CAD or stents.  Instructed to hold aspirin x 5 days - instructed pt to f/u with Dr. Newby to discuss results  I have provided the patient the opportunity to ask questions and have answered them to their satisfaction.  They are encouraged to contact our office with any further questions, concerns, or issues.  Above case and plan d/w Dr. Duval. [Other: _____] : [unfilled]

## 2024-02-16 NOTE — PHYSICAL EXAM
[Alert] : alert [No Acute Distress] : no acute distress [Well Nourished] : well nourished [Well Developed] : well developed [Healthy Appearance] : healthy appearance [Normal Voice/Communication] : normal voice communication [No Respiratory Distress] : no respiratory distress [Normal Rate and Effort] : normal respiratory rhythm and effort [No Accessory Muscle Use] : no accessory muscle use [Oriented x3] : oriented to person, place, and time [Normal Insight/Judgement] : insight and judgment were intact [Normal Affect] : the affect was normal [Normal Mood] : the mood was normal [Recent Memory Normal] : recent memory was not impaired [Remote Memory Normal] : remote memory was not impaired [Restricted in physically strenuous activity but ambulatory and able to carry out work of a light or sedentary nature] : Restricted in physically strenuous activity but ambulatory and able to carry out work of a light or sedentary nature, e.g., light house work, office work

## 2024-02-16 NOTE — HISTORY OF PRESENT ILLNESS
[FreeTextEntry1] : This is a 68 year old with pmhx of anxiety, HTN, glaucoma, and recent aspiration pneumonia who presents today for consultation for lymph node biopsy.   Had URI in November,   Pt developed aspiration pna after colonoscopy in December ( 12/4) was treated with Zosyn and Augmentin Off ab for the last 2 weeks.   A few weeks later he contracted COVID.  Fever up to 102, 2-4 times awakening. unintentional weight loss of 15 lbs since December,low back pain x years,  Evaluated in January by Dr. Newby for persistent chills, night sweats, weight loss, and fevers.    Over the last 2 weeks, denies fever, night sweats, had increase in appetite. Has been having headaches.   Energy still low, needs a nap in the afternoon, which he had never done before.   He was sent for imaging and found to have pelvic lymphadenopathy.  He is now referred to IR by Dr. Newby for lymph node biopsy for diagnosis.   ID: Odin

## 2024-02-16 NOTE — HISTORY OF PRESENT ILLNESS
[FreeTextEntry1] : This is a 68 year old with pmhx of anxiety, HTN, glaucoma, and recent aspiration pneumonia who presents today for consultation for lymph node biopsy.    Pt reports having URI in November.  He had a colonoscopy shortly afterwards and developed aspiration pna after colonoscopy in December ( 12/4).  He was treated with Zosyn and Augmentin.  A few weeks later he contracted COVID. Since December he has been experiencing intermittent fevers (up to 102), night sweats (2-4 times/night), chills, and an unintentional weight loss of 15 lbs since December.     Evaluated in January by Dr. Newby for persistent symptoms. He was sent for imaging and found to have pelvic lymphadenopathy.  He is now referred to IR by Dr. Newby for lymph node biopsy for diagnosis.   Over the last 2 weeks, he has been off antibiotics and denies fever, night sweats, had increase in appetite. Has been having headaches on/off for the last 2 weeks and reports chronic lower back pain. His energy level is still low, needs a nap in the afternoon, which he had never done before.   ID: Odin

## 2024-02-16 NOTE — REASON FOR VISIT
[Home] : at home, [unfilled] , at the time of the visit. [Medical Office: ([facility])___] : at the medical office located in  [Patient] : the patient [Consultation] : a consultation visit [FreeTextEntry1] : lymph node biopsy

## 2024-02-22 LAB
ANION GAP SERPL CALC-SCNC: 10 MMOL/L
BUN SERPL-MCNC: 19 MG/DL
CALCIUM SERPL-MCNC: 10.4 MG/DL
CHLORIDE SERPL-SCNC: 102 MMOL/L
CO2 SERPL-SCNC: 26 MMOL/L
CREAT SERPL-MCNC: 0.81 MG/DL
EGFR: 97 ML/MIN/1.73M2
GLUCOSE SERPL-MCNC: 82 MG/DL
HCT VFR BLD CALC: 40.4 %
HGB BLD-MCNC: 13.1 G/DL
INR PPP: 0.87 RATIO
MCHC RBC-ENTMCNC: 29.6 PG
MCHC RBC-ENTMCNC: 32.4 GM/DL
MCV RBC AUTO: 91.4 FL
PLATELET # BLD AUTO: 239 K/UL
POTASSIUM SERPL-SCNC: 5 MMOL/L
PT BLD: 10 SEC
RBC # BLD: 4.42 M/UL
RBC # FLD: 13.3 %
SODIUM SERPL-SCNC: 138 MMOL/L
WBC # FLD AUTO: 8.08 K/UL

## 2024-02-23 ENCOUNTER — OUTPATIENT (OUTPATIENT)
Dept: OUTPATIENT SERVICES | Facility: HOSPITAL | Age: 69
LOS: 1 days | End: 2024-02-23
Payer: COMMERCIAL

## 2024-02-23 ENCOUNTER — TRANSCRIPTION ENCOUNTER (OUTPATIENT)
Age: 69
End: 2024-02-23

## 2024-02-23 ENCOUNTER — RESULT REVIEW (OUTPATIENT)
Age: 69
End: 2024-02-23

## 2024-02-23 VITALS
TEMPERATURE: 98 F | HEART RATE: 69 BPM | OXYGEN SATURATION: 99 % | RESPIRATION RATE: 18 BRPM | DIASTOLIC BLOOD PRESSURE: 86 MMHG | SYSTOLIC BLOOD PRESSURE: 135 MMHG

## 2024-02-23 VITALS
HEART RATE: 69 BPM | DIASTOLIC BLOOD PRESSURE: 98 MMHG | RESPIRATION RATE: 16 BRPM | OXYGEN SATURATION: 96 % | SYSTOLIC BLOOD PRESSURE: 149 MMHG

## 2024-02-23 DIAGNOSIS — R59.1 GENERALIZED ENLARGED LYMPH NODES: ICD-10-CM

## 2024-02-23 DIAGNOSIS — R59.0 LOCALIZED ENLARGED LYMPH NODES: ICD-10-CM

## 2024-02-23 DIAGNOSIS — Z98.890 OTHER SPECIFIED POSTPROCEDURAL STATES: Chronic | ICD-10-CM

## 2024-02-23 PROCEDURE — 88172 CYTP DX EVAL FNA 1ST EA SITE: CPT | Mod: 26

## 2024-02-23 PROCEDURE — 88305 TISSUE EXAM BY PATHOLOGIST: CPT | Mod: 26

## 2024-02-23 PROCEDURE — 88189 FLOWCYTOMETRY/READ 16 & >: CPT

## 2024-02-23 PROCEDURE — 88173 CYTOPATH EVAL FNA REPORT: CPT | Mod: 26

## 2024-02-23 RX ORDER — AMLODIPINE BESYLATE 2.5 MG/1
1 TABLET ORAL
Refills: 0 | DISCHARGE

## 2024-02-26 ENCOUNTER — RESULT REVIEW (OUTPATIENT)
Age: 69
End: 2024-02-26

## 2024-02-26 PROCEDURE — 87205 SMEAR GRAM STAIN: CPT

## 2024-02-26 PROCEDURE — 88173 CYTOPATH EVAL FNA REPORT: CPT

## 2024-02-26 PROCEDURE — 88305 TISSUE EXAM BY PATHOLOGIST: CPT

## 2024-02-26 PROCEDURE — 10009 FNA BX W/CT GDN 1ST LES: CPT

## 2024-02-26 PROCEDURE — 88172 CYTP DX EVAL FNA 1ST EA SITE: CPT

## 2024-02-26 PROCEDURE — 88184 FLOWCYTOMETRY/ TC 1 MARKER: CPT

## 2024-02-26 PROCEDURE — 88185 FLOWCYTOMETRY/TC ADD-ON: CPT

## 2024-02-28 LAB — NON-GYNECOLOGICAL CYTOLOGY STUDY: SIGNIFICANT CHANGE UP

## 2024-03-01 ENCOUNTER — NON-APPOINTMENT (OUTPATIENT)
Age: 69
End: 2024-03-01

## 2024-03-05 ENCOUNTER — APPOINTMENT (OUTPATIENT)
Dept: SURGICAL ONCOLOGY | Facility: CLINIC | Age: 69
End: 2024-03-05
Payer: COMMERCIAL

## 2024-03-05 VITALS
HEART RATE: 83 BPM | WEIGHT: 183 LBS | HEIGHT: 70 IN | BODY MASS INDEX: 26.2 KG/M2 | DIASTOLIC BLOOD PRESSURE: 88 MMHG | OXYGEN SATURATION: 97 % | RESPIRATION RATE: 17 BRPM | SYSTOLIC BLOOD PRESSURE: 145 MMHG

## 2024-03-05 PROCEDURE — 99204 OFFICE O/P NEW MOD 45 MIN: CPT

## 2024-03-06 LAB — TM INTERPRETATION: SIGNIFICANT CHANGE UP

## 2024-03-09 NOTE — HISTORY OF PRESENT ILLNESS
[de-identified] : Mr. Jackson is a 70 y/o male who presents for evaluation of slowly enlarging retroperitoneal/pelvis adenopathy. He developed aspiration pneumonia after colonoscopy 12/2023 and has been having persistent fever/fatigue during prolonged recovery. CT chest/abdomen/pelvis 02/08/2024 showed resolution of left lung infiltrate and improvement in left paratracheal node.  However, he was found to have interval enlargement of left para-aortic, left pelvic sidewall and left external iliac node. IR CT guided needle biopsy of left para-aortic node 02/23/2024 was nondiagnostic. He is referred for further evaluation.

## 2024-03-09 NOTE — REASON FOR VISIT
[Initial Consultation] : an initial consultation for [Spouse] : spouse [FreeTextEntry2] : retroperitoneal adenopathy

## 2024-03-09 NOTE — ASSESSMENT
[FreeTextEntry1] : 68 y/o male presents with interval enlargement for retroperitoneal/pelvic adenopathy of unclear significance. However, a low grade lymphoma cannot be excluded without adequate tissue sampling. CT scan imaging was reviewed personally with the patient and his spouse. Location of concerning adenopathy is not easily amenable to IR or surgical sampling. To obtain PET/CT scan for further evaluation of adenopathy to concern for malignancy and determine optimal site for tissue sampling if indicated. All questions answered.

## 2024-03-14 ENCOUNTER — NON-APPOINTMENT (OUTPATIENT)
Age: 69
End: 2024-03-14

## 2024-03-16 ENCOUNTER — APPOINTMENT (OUTPATIENT)
Dept: NUCLEAR MEDICINE | Facility: IMAGING CENTER | Age: 69
End: 2024-03-16
Payer: COMMERCIAL

## 2024-03-16 ENCOUNTER — OUTPATIENT (OUTPATIENT)
Dept: OUTPATIENT SERVICES | Facility: HOSPITAL | Age: 69
LOS: 1 days | End: 2024-03-16
Payer: COMMERCIAL

## 2024-03-16 DIAGNOSIS — Z00.8 ENCOUNTER FOR OTHER GENERAL EXAMINATION: ICD-10-CM

## 2024-03-16 DIAGNOSIS — R59.1 GENERALIZED ENLARGED LYMPH NODES: ICD-10-CM

## 2024-03-16 DIAGNOSIS — R63.4 ABNORMAL WEIGHT LOSS: ICD-10-CM

## 2024-03-16 DIAGNOSIS — Z98.890 OTHER SPECIFIED POSTPROCEDURAL STATES: Chronic | ICD-10-CM

## 2024-03-16 PROCEDURE — A9552: CPT

## 2024-03-16 PROCEDURE — 78815 PET IMAGE W/CT SKULL-THIGH: CPT

## 2024-03-16 PROCEDURE — 78815 PET IMAGE W/CT SKULL-THIGH: CPT | Mod: 26,PI

## 2024-03-19 PROBLEM — R59.1 LYMPHADENOPATHY: Status: ACTIVE | Noted: 2024-02-16

## 2024-03-21 ENCOUNTER — NON-APPOINTMENT (OUTPATIENT)
Age: 69
End: 2024-03-21

## 2024-03-22 ENCOUNTER — APPOINTMENT (OUTPATIENT)
Dept: INTERVENTIONAL RADIOLOGY/VASCULAR | Facility: CLINIC | Age: 69
End: 2024-03-22

## 2024-03-22 DIAGNOSIS — R59.1 GENERALIZED ENLARGED LYMPH NODES: ICD-10-CM

## 2024-03-22 PROCEDURE — XXXXX: CPT | Mod: 1L

## 2024-03-22 NOTE — ASSESSMENT
[FreeTextEntry1] : This is a 69 year old with pmhx of anxiety, HTN, glaucoma, and recent aspiration pneumonia who presents today for follow up s/p lymph node biopsy.    1.  Lymphadenopathy -  intermittent fevers (up to 102), night sweats (2-4 times/night), chills, and an unintentional weight loss of 15 lbs since December - 3/16/24 PET CT demonstrates: >  Left para-aortic and left external iliac lymph nodes with and without FDG activity, slightly smaller compared to the most recent CT. Most intense activity in the left common iliac node which is accessible for percutaneous biopsy. > FDG avid right thyroid lobe hypodense nodule with calcific component for which correlation with sonography is recommended. > Inflammatory FDG activity in the right hip. - imaging reviewed and discussed with patient - Discussed given small size of lymph noted it is very difficult to access - Given stability in size of lymph node and discussion with Dr. Nolen will continue to monitor at this time - repeat CT in 3 mos as ordered by Dr. Nolen - [place] with IR after - continue with consistent follow ups with Dr. Nolen and Dr. Newby  I have provided the patient the opportunity to ask questions and have answered them to their satisfaction.  They are encouraged to contact our office with any further questions, concerns, or issues.

## 2024-03-22 NOTE — REASON FOR VISIT
[Home] : at home, [unfilled] , at the time of the visit. [Medical Office: ([facility])___] : at the medical office located in  [Verbal consent obtained from patient] : the patient, [unfilled] [FreeTextEntry1] : lymph node biopsy

## 2024-03-22 NOTE — HISTORY OF PRESENT ILLNESS
[FreeTextEntry1] : This is a 69 year old with pmhx of anxiety, HTN, glaucoma, and recent aspiration pneumonia who presents today for follow up s/p lymph node biopsy.    Pt reports having URI in November.  He had a colonoscopy shortly afterwards and developed aspiration pna after colonoscopy in December (12/4).  He was treated with Zosyn and Augmentin.  A few weeks later he contracted COVID. Since December he has been experiencing intermittent fevers (up to 102), night sweats (2-4 times/night), chills, and an unintentional weight loss of 15 lbs since December.     Evaluated in January by Dr. Newby for persistent symptoms. He was sent for imaging and found to have pelvic lymphadenopathy.  He was referred to IR by Dr. Newby for lymph node biopsy for diagnosis. He is now s/p RP lymph node bx which was nondiagnostic and presents for follow up.   Prior to bx he had been off antibiotics and denies fever, night sweats, had increase in appetite. Had been having headaches on/off and reported chronic lower back pain. His energy level is still low, needs a nap in the afternoon, which he had never done before.  Today pt reports no longer has any fevers, night sweats or unintentional weight loss for about 3 weeks now. He has been following with Dr. Nolen.   ID: Odin Surg Onc: Jake Nolen

## 2024-04-15 ENCOUNTER — APPOINTMENT (OUTPATIENT)
Dept: ULTRASOUND IMAGING | Facility: IMAGING CENTER | Age: 69
End: 2024-04-15
Payer: COMMERCIAL

## 2024-04-15 ENCOUNTER — OUTPATIENT (OUTPATIENT)
Dept: OUTPATIENT SERVICES | Facility: HOSPITAL | Age: 69
LOS: 1 days | End: 2024-04-15
Payer: COMMERCIAL

## 2024-04-15 DIAGNOSIS — Z98.890 OTHER SPECIFIED POSTPROCEDURAL STATES: Chronic | ICD-10-CM

## 2024-04-15 DIAGNOSIS — E04.1 NONTOXIC SINGLE THYROID NODULE: ICD-10-CM

## 2024-04-15 PROCEDURE — 76536 US EXAM OF HEAD AND NECK: CPT

## 2024-04-15 PROCEDURE — 76536 US EXAM OF HEAD AND NECK: CPT | Mod: 26

## 2024-04-20 DIAGNOSIS — E04.1 NONTOXIC SINGLE THYROID NODULE: ICD-10-CM

## 2024-04-29 ENCOUNTER — RESULT REVIEW (OUTPATIENT)
Age: 69
End: 2024-04-29

## 2024-04-29 NOTE — PRE-ANESTHESIA EVALUATION ADULT - HEART RATE (BEATS/MIN)
[facility] EMERGENCY DEPARTMENT  EMERGENCY DEPARTMENT ENCOUNTER      Pt Name: Clark Gonzales  MRN: [id number]  Birthdate 1977  Date of evaluation: 4/29/2024  Provider: MERNA Miller  PCP: Tulio Aviles DO  Note Started: 2:23 PM EDT     The ED Attending Physician was available for consultation but did not see or evaluate this patient.    CHIEF COMPLAINT       Chief Complaint   Patient presents with    Medication Refill     Out of  BP medication x 1 month along with with other medication    Abdominal Pain     Abdominal pain/nausea/diarrhea       HISTORY OF PRESENT ILLNESS   (Location, Timing/Onset, Context/Setting, Quality, Duration, Modifying Factors, Severity, Associated Signs and Symptoms)  Note limiting factors.     Clark Gonzales is a 47 y.o. male who presents for medication refills.  Patient says he has been out of all his medications for about a month, and he called his doctor's office but the doctor said he could not get the patient in for an appointment for at least another month, and need to see him before medications could be refilled.  He says his doctor suggested he come to the ED.  Patient says that not taking his stomach medication has made his stomach act up, and this is a chronic condition for him, no changes.  He is eating, but he gets nauseous often.  No vomiting.  No diarrhea or constipation.  He denies any chest pain or difficulty breathing.  No other complaints.    Nursing Notes were all reviewed and agreed with or any disagreements were addressed in the HPI.    REVIEW OF SYSTEMS    (2-9 systems for level 4, 10 or more for level 5)     Positives and pertinent negatives as per HPI.     PAST MEDICAL HISTORY     Past Medical History:   Diagnosis Date    Chronic systolic congestive heart failure (HCC) 8/4/2021    Coronary artery disease     Essential hypertension 04/07/2020    GERD (gastroesophageal reflux disease)     Headache     NSTEMI (non-ST elevated myocardial  77

## 2024-05-22 ENCOUNTER — OUTPATIENT (OUTPATIENT)
Dept: OUTPATIENT SERVICES | Facility: HOSPITAL | Age: 69
LOS: 1 days | End: 2024-05-22
Payer: COMMERCIAL

## 2024-05-22 ENCOUNTER — APPOINTMENT (OUTPATIENT)
Dept: ULTRASOUND IMAGING | Facility: IMAGING CENTER | Age: 69
End: 2024-05-22
Payer: COMMERCIAL

## 2024-05-22 ENCOUNTER — RESULT REVIEW (OUTPATIENT)
Age: 69
End: 2024-05-22

## 2024-05-22 DIAGNOSIS — Z98.890 OTHER SPECIFIED POSTPROCEDURAL STATES: Chronic | ICD-10-CM

## 2024-05-22 DIAGNOSIS — Z00.8 ENCOUNTER FOR OTHER GENERAL EXAMINATION: ICD-10-CM

## 2024-05-22 PROCEDURE — 87205 SMEAR GRAM STAIN: CPT

## 2024-05-22 PROCEDURE — 88172 CYTP DX EVAL FNA 1ST EA SITE: CPT

## 2024-05-22 PROCEDURE — 88173 CYTOPATH EVAL FNA REPORT: CPT

## 2024-05-22 PROCEDURE — 88185 FLOWCYTOMETRY/TC ADD-ON: CPT

## 2024-05-22 PROCEDURE — 10005 FNA BX W/US GDN 1ST LES: CPT

## 2024-05-22 PROCEDURE — 88305 TISSUE EXAM BY PATHOLOGIST: CPT

## 2024-05-22 PROCEDURE — 10006 FNA BX W/US GDN EA ADDL: CPT

## 2024-05-22 PROCEDURE — 88305 TISSUE EXAM BY PATHOLOGIST: CPT | Mod: 26

## 2024-05-22 PROCEDURE — 88173 CYTOPATH EVAL FNA REPORT: CPT | Mod: 26

## 2024-05-22 PROCEDURE — 88189 FLOWCYTOMETRY/READ 16 & >: CPT

## 2024-05-22 PROCEDURE — 88184 FLOWCYTOMETRY/ TC 1 MARKER: CPT

## 2024-05-23 LAB — TM INTERPRETATION: SIGNIFICANT CHANGE UP

## 2024-05-28 LAB — NON-GYNECOLOGICAL CYTOLOGY STUDY: SIGNIFICANT CHANGE UP

## 2024-08-12 ENCOUNTER — APPOINTMENT (OUTPATIENT)
Dept: CT IMAGING | Facility: IMAGING CENTER | Age: 69
End: 2024-08-12

## 2024-10-21 ENCOUNTER — APPOINTMENT (OUTPATIENT)
Dept: SURGERY | Facility: CLINIC | Age: 69
End: 2024-10-21
Payer: COMMERCIAL

## 2024-10-21 VITALS
DIASTOLIC BLOOD PRESSURE: 89 MMHG | OXYGEN SATURATION: 95 % | BODY MASS INDEX: 25.91 KG/M2 | TEMPERATURE: 98.1 F | HEART RATE: 92 BPM | WEIGHT: 181 LBS | SYSTOLIC BLOOD PRESSURE: 135 MMHG | HEIGHT: 70 IN

## 2024-10-21 PROCEDURE — 99213 OFFICE O/P EST LOW 20 MIN: CPT

## 2024-10-21 RX ORDER — MULTIVITAMIN
TABLET ORAL
Refills: 0 | Status: ACTIVE | COMMUNITY

## 2024-10-24 ENCOUNTER — OUTPATIENT (OUTPATIENT)
Dept: OUTPATIENT SERVICES | Facility: HOSPITAL | Age: 69
LOS: 1 days | Discharge: HOME | End: 2024-10-24
Payer: COMMERCIAL

## 2024-10-24 VITALS
SYSTOLIC BLOOD PRESSURE: 127 MMHG | TEMPERATURE: 98 F | OXYGEN SATURATION: 98 % | DIASTOLIC BLOOD PRESSURE: 87 MMHG | HEART RATE: 68 BPM | WEIGHT: 181.44 LBS | RESPIRATION RATE: 18 BRPM | HEIGHT: 70 IN

## 2024-10-24 DIAGNOSIS — Z98.890 OTHER SPECIFIED POSTPROCEDURAL STATES: Chronic | ICD-10-CM

## 2024-10-24 DIAGNOSIS — Z01.818 ENCOUNTER FOR OTHER PREPROCEDURAL EXAMINATION: ICD-10-CM

## 2024-10-24 DIAGNOSIS — K40.90 UNILATERAL INGUINAL HERNIA, WITHOUT OBSTRUCTION OR GANGRENE, NOT SPECIFIED AS RECURRENT: ICD-10-CM

## 2024-10-24 DIAGNOSIS — I10 ESSENTIAL (PRIMARY) HYPERTENSION: ICD-10-CM

## 2024-10-24 LAB
ANION GAP SERPL CALC-SCNC: 3 MMOL/L — LOW (ref 5–17)
BUN SERPL-MCNC: 20 MG/DL — SIGNIFICANT CHANGE UP (ref 7–23)
CALCIUM SERPL-MCNC: 10.7 MG/DL — HIGH (ref 8.5–10.1)
CHLORIDE SERPL-SCNC: 105 MMOL/L — SIGNIFICANT CHANGE UP (ref 96–108)
CO2 SERPL-SCNC: 30 MMOL/L — SIGNIFICANT CHANGE UP (ref 22–31)
CREAT SERPL-MCNC: 0.87 MG/DL — SIGNIFICANT CHANGE UP (ref 0.5–1.3)
EGFR: 94 ML/MIN/1.73M2 — SIGNIFICANT CHANGE UP
GLUCOSE SERPL-MCNC: 107 MG/DL — HIGH (ref 70–99)
HCT VFR BLD CALC: 49.7 % — SIGNIFICANT CHANGE UP (ref 39–50)
HGB BLD-MCNC: 16.5 G/DL — SIGNIFICANT CHANGE UP (ref 13–17)
MCHC RBC-ENTMCNC: 30.3 PG — SIGNIFICANT CHANGE UP (ref 27–34)
MCHC RBC-ENTMCNC: 33.2 G/DL — SIGNIFICANT CHANGE UP (ref 32–36)
MCV RBC AUTO: 91.2 FL — SIGNIFICANT CHANGE UP (ref 80–100)
NRBC # BLD: 0 /100 WBCS — SIGNIFICANT CHANGE UP (ref 0–0)
PLATELET # BLD AUTO: 249 K/UL — SIGNIFICANT CHANGE UP (ref 150–400)
POTASSIUM SERPL-MCNC: 4.2 MMOL/L — SIGNIFICANT CHANGE UP (ref 3.5–5.3)
POTASSIUM SERPL-SCNC: 4.2 MMOL/L — SIGNIFICANT CHANGE UP (ref 3.5–5.3)
RBC # BLD: 5.45 M/UL — SIGNIFICANT CHANGE UP (ref 4.2–5.8)
RBC # FLD: 12.4 % — SIGNIFICANT CHANGE UP (ref 10.3–14.5)
SODIUM SERPL-SCNC: 138 MMOL/L — SIGNIFICANT CHANGE UP (ref 135–145)
WBC # BLD: 8.23 K/UL — SIGNIFICANT CHANGE UP (ref 3.8–10.5)
WBC # FLD AUTO: 8.23 K/UL — SIGNIFICANT CHANGE UP (ref 3.8–10.5)

## 2024-10-24 PROCEDURE — 93010 ELECTROCARDIOGRAM REPORT: CPT

## 2024-10-24 NOTE — H&P PST ADULT - HISTORY OF PRESENT ILLNESS
.......68 year male with PMH HTN on amlodipine seen in endoscopy suite after having and episode od hypoxemia following colonoscopy . The patient was stabilized with O2 and is now saturating > 95%. The patient c/o chills and has witnessed episodes of NBNB emesis following the procedure. The patient was admitted with a clinical presentation consistent with likely aspiration pneumonia. No fever documented at the time of my exam. Denied any complaints prior to colonoscopy  [age]M pmh htn here for PST for scheduled robotic assisted laparoscopic right inguinal hernia repair with mesh possible left inguinal hernia repair with mesh possible open to be performed by Dr. Baker on 10-

## 2024-10-24 NOTE — H&P PST ADULT - ASSESSMENT
69M pmh htn here for PST for scheduled robotic assisted laparoscopic right inguinal hernia repair with mesh possible left inguinal hernia repair with mesh possible open to be performed by Dr. Baker on 10-  CAPRINI SCORE    AGE RELATED RISK FACTORS                                                             [ ] Age 41-60 years                                            (1 Point)  [x ] Age: 61-74 years                                           (2 Points)                 [ ] Age= 75 years                                                (3 Points)             DISEASE RELATED RISK FACTORS                                                       [ ] Edema in the lower extremities                 (1 Point)                     [ ] Varicose veins                                               (1 Point)                                 [x ] BMI > 25 Kg/m2                                            (1 Point)                                  [ ] Serious infection (ie PNA)                            (1 Point)                     [ ] Lung disease ( COPD, Emphysema)            (1 Point)                                                                          [ ] Acute myocardial infarction                         (1 Point)                  [ ] Congestive heart failure (in the previous month)  (1 Point)         [ ] Inflammatory bowel disease                            (1 Point)                  [ ] Central venous access, PICC or Port               (2 points)       (within the last month)                                                                [ ] Stroke (in the previous month)                        (5 Points)    [ ] Previous or present malignancy                       (2 points)                                                                                                                                                         HEMATOLOGY RELATED FACTORS                                                         [ ] Prior episodes of VTE                                     (3 Points)                     [ ] Positive family history for VTE                      (3 Points)                  [ ] Prothrombin 04322 A                                     (3 Points)                     [ ] Factor V Leiden                                                (3 Points)                        [ ] Lupus anticoagulants                                      (3 Points)                                                           [ ] Anticardiolipin antibodies                              (3 Points)                                                       [ ] High homocysteine in the blood                   (3 Points)                                             [ ] Other congenital or acquired thrombophilia      (3 Points)                                                [ ] Heparin induced thrombocytopenia                  (3 Points)                                        MOBILITY RELATED FACTORS  [ ] Bed rest                                                         (1 Point)  [ ] Plaster cast                                                    (2 points)  [ ] Bed bound for more than 72 hours           (2 Points)    GENDER SPECIFIC FACTORS  [ ] Pregnancy or had a baby within the last month   (1 Point)  [ ] Post-partum < 6 weeks                                   (1 Point)  [ ] Hormonal therapy  or oral contraception   (1 Point)  [ ] History of pregnancy complications              (1 point)  [ ] Unexplained or recurrent              (1 Point)    OTHER RISK FACTORS                                           (1 Point)  [ ] BMI >40, smoking, diabetes requiring insulin, chemotherapy  blood transfusions and length of surgery over 2 hours    SURGERY RELATED RISK FACTORS  [ ]  Section within the last month     (1 Point)  [ ] Minor surgery                                                  (1 Point)  [ ] Arthroscopic surgery                                       (2 Points)  [ x] Planned major surgery lasting more            (2 Points)      than 45 minutes     [ ] Elective hip or knee joint replacement       (5 points)       surgery                                                TRAUMA RELATED RISK FACTORS  [ ] Fracture of the hip, pelvis, or leg                       (5 Points)  [ ] Spinal cord injury resulting in paralysis             (5 points)       (in the previous month)    [ ] Paralysis  (less than 1 month)                             (5 Points)  [ ] Multiple Trauma within 1 month                        (5 Points)    Total Score [  5  ]    Caprini Score 0-2: Low Risk, NO VTE prophylaxis required for most patients, encourage ambulation  Caprini Score 3-6: Moderate Risk , pharmacologic VTE prophylaxis is indicated for most patients (in the absence of contraindications)  Caprini Score Greater than or =7: High risk, pharmocologic VTE prophylaxis indicated for most patients (in the absence of contraindications)

## 2024-10-24 NOTE — H&P PST ADULT - PROBLEM SELECTOR PLAN 1
Robotic assisted laparoscopic right inguinal hernia repair with mesh possible left inguinal hernia repair with mesh possible open  Pre-op instructions given, patient verbalized understanding  Chlorhexidine wash instructions given   medical clearance  Anesthesiologist to review PST labs, EKG, required clearances and optimization for surgery.

## 2024-10-24 NOTE — H&P PST ADULT - NSICDXPASTSURGICALHX_GEN_ALL_CORE_FT
PAST SURGICAL HISTORY:  H/O hernia repair      PAST SURGICAL HISTORY:  H/O colonoscopy     H/O hernia repair

## 2024-10-28 ENCOUNTER — APPOINTMENT (OUTPATIENT)
Dept: SURGERY | Facility: CLINIC | Age: 69
End: 2024-10-28

## 2024-10-29 ENCOUNTER — TRANSCRIPTION ENCOUNTER (OUTPATIENT)
Age: 69
End: 2024-10-29

## 2024-10-29 ENCOUNTER — RESULT REVIEW (OUTPATIENT)
Age: 69
End: 2024-10-29

## 2024-10-29 ENCOUNTER — APPOINTMENT (OUTPATIENT)
Dept: SURGERY | Facility: HOSPITAL | Age: 69
End: 2024-10-29

## 2024-10-29 ENCOUNTER — OUTPATIENT (OUTPATIENT)
Dept: OUTPATIENT SERVICES | Facility: HOSPITAL | Age: 69
LOS: 1 days | Discharge: HOME | End: 2024-10-29
Payer: COMMERCIAL

## 2024-10-29 VITALS
SYSTOLIC BLOOD PRESSURE: 134 MMHG | HEART RATE: 94 BPM | RESPIRATION RATE: 18 BRPM | DIASTOLIC BLOOD PRESSURE: 81 MMHG | OXYGEN SATURATION: 96 %

## 2024-10-29 VITALS
WEIGHT: 181 LBS | TEMPERATURE: 98 F | OXYGEN SATURATION: 98 % | HEART RATE: 79 BPM | RESPIRATION RATE: 16 BRPM | DIASTOLIC BLOOD PRESSURE: 93 MMHG | HEIGHT: 70 IN | SYSTOLIC BLOOD PRESSURE: 145 MMHG

## 2024-10-29 DIAGNOSIS — Z98.890 OTHER SPECIFIED POSTPROCEDURAL STATES: Chronic | ICD-10-CM

## 2024-10-29 PROCEDURE — 88304 TISSUE EXAM BY PATHOLOGIST: CPT | Mod: 26

## 2024-10-29 PROCEDURE — 49505 PRP I/HERN INIT REDUC >5 YR: CPT | Mod: RT

## 2024-10-29 PROCEDURE — 49650 LAP ING HERNIA REPAIR INIT: CPT | Mod: AS,RT

## 2024-10-29 DEVICE — MESH HERNIA INGUINAL PROGRIP LAPAROSCOPIC 15 X 10CM LEFT: Type: IMPLANTABLE DEVICE | Site: RIGHT | Status: FUNCTIONAL

## 2024-10-29 DEVICE — MESH HERNIA INGUINAL PROGRIP LAPAROSCOPIC 15 X 10CM RIGHT: Type: IMPLANTABLE DEVICE | Site: RIGHT | Status: FUNCTIONAL

## 2024-10-29 DEVICE — CLIP APPLIER COVIDIEN ENDOCLIP III 5MM: Type: IMPLANTABLE DEVICE | Site: RIGHT | Status: FUNCTIONAL

## 2024-10-29 RX ORDER — ONDANSETRON HYDROCHLORIDE 2 MG/ML
4 INJECTION, SOLUTION INTRAMUSCULAR; INTRAVENOUS ONCE
Refills: 0 | Status: COMPLETED | OUTPATIENT
Start: 2024-10-29 | End: 2024-10-29

## 2024-10-29 RX ORDER — ACETAMINOPHEN 500 MG
2 TABLET ORAL
Qty: 40 | Refills: 0
Start: 2024-10-29

## 2024-10-29 RX ORDER — FENTANYL CITRAT/DEXTROSE 5%/PF 1250MCG/50
25 PATIENT CONTROLLED ANALGESIA SYRINGE INTRAVENOUS
Refills: 0 | Status: DISCONTINUED | OUTPATIENT
Start: 2024-10-29 | End: 2024-10-30

## 2024-10-29 RX ORDER — OXYCODONE HYDROCHLORIDE 30 MG/1
1 TABLET ORAL
Qty: 15 | Refills: 0
Start: 2024-10-29

## 2024-10-29 RX ORDER — IBUPROFEN 200 MG
1 TABLET ORAL
Qty: 15 | Refills: 0
Start: 2024-10-29 | End: 2024-10-31

## 2024-10-29 RX ORDER — FENTANYL CITRAT/DEXTROSE 5%/PF 1250MCG/50
50 PATIENT CONTROLLED ANALGESIA SYRINGE INTRAVENOUS ONCE
Refills: 0 | Status: DISCONTINUED | OUTPATIENT
Start: 2024-10-29 | End: 2024-10-29

## 2024-10-29 RX ORDER — FAMOTIDINE 10 MG/ML
20 INJECTION INTRAVENOUS ONCE
Refills: 0 | Status: COMPLETED | OUTPATIENT
Start: 2024-10-29 | End: 2024-10-29

## 2024-10-29 RX ORDER — SODIUM CHLORIDE 9 MG/ML
3 INJECTION, SOLUTION INTRAMUSCULAR; INTRAVENOUS; SUBCUTANEOUS EVERY 8 HOURS
Refills: 0 | Status: DISCONTINUED | OUTPATIENT
Start: 2024-10-29 | End: 2024-10-29

## 2024-10-29 RX ADMIN — Medication 100 MILLILITER(S): at 18:19

## 2024-10-29 RX ADMIN — FAMOTIDINE 20 MILLIGRAM(S): 10 INJECTION INTRAVENOUS at 18:41

## 2024-10-29 RX ADMIN — ONDANSETRON HYDROCHLORIDE 4 MILLIGRAM(S): 2 INJECTION, SOLUTION INTRAMUSCULAR; INTRAVENOUS at 18:18

## 2024-10-29 RX ADMIN — Medication 50 MICROGRAM(S): at 18:42

## 2024-10-29 NOTE — ASU DISCHARGE PLAN (ADULT/PEDIATRIC) - NS MD DC FALL RISK RISK
For information on Fall & Injury Prevention, visit: [email or web address] OR  [email or web address] OR  https://www.cdc.gov/steadi/patient.html

## 2024-10-29 NOTE — ASU PREOP CHECKLIST - SIDE RAILS UP
[address] [facility]  eMERGENCY dEPARTMENT eNCOUnter      Pt Name: Daniella Ríos  MRN: [id number]  [place] 1978  Date of evaluation: 11/7/2022  Provider: Tomasa Godinez MD    [address] [place] [address]       Chief Complaint   Patient presents with    Cough     Reports having cough/bodyaches/fever/headache/some nausea/sob onset yesterday/ but did take home covid x 2 and was neg/ took 400mg motrin around 8a/ pt also reports she has forgotten to take bp med x several days         HISTORY OF PRESENT ILLNESS   (Location/Symptom, Timing/Onset, Context/Setting, Quality, Duration, Modifying Factors, Severity)  Note limiting factors. Daniella Ríos is a 38 y.o. female who presents for 1 day of cough, body, fever, headache, nausea, and shortness of breath. Patient reports ibuprofen has improved her symptoms. Denies any known aggravating factors. Denies any neck stiffness ultimately status or severe difficulty breathing. Denies any abdominal pain does report diffuse body aches. HPI    Nursing Notes were reviewed. REVIEW OFSYSTEMS    (2-9 systems for level 4, 10 or more for level 5)     Review of Systems   Constitutional:  Positive for fever. Negative for appetite change and unexpected weight change. HENT:  Negative for facial swelling, trouble swallowing and voice change. Eyes:  Negative for visual disturbance. Respiratory:  Positive for cough and shortness of breath. Negative for wheezing and stridor. Cardiovascular:  Negative for chest pain and palpitations. Gastrointestinal:  Positive for nausea. Negative for abdominal pain and vomiting. Genitourinary:  Negative for dysuria and vaginal bleeding. Musculoskeletal:  Positive for arthralgias and myalgias. Negative for neck pain and neck stiffness. Skin:  Negative for color change and wound. Neurological:  Positive for headaches. Negative for seizures and syncope.    Psychiatric/Behavioral:  Negative for self-injury and suicidal ideas. Except as noted above the remainder of the review of systems was reviewed and negative. PAST MEDICAL HISTORY     Past Medical History:   Diagnosis Date    Anxiety     Depression     Hypertension     PTSD (post-traumatic stress disorder)          SURGICAL HISTORY       Past Surgical History:   Procedure Laterality Date     SECTION      x2    WISDOM TOOTH EXTRACTION Bilateral          CURRENT MEDICATIONS       Previous Medications    ATORVASTATIN (LIPITOR) 10 MG TABLET    Take 1 tablet by mouth daily    LISINOPRIL (PRINIVIL;ZESTRIL) 20 MG TABLET    Take 1 tablet by mouth daily    NALTREXONE (DEPADE) 50 MG TABLET    Take 0.5 tablets by mouth daily    SERTRALINE (ZOLOFT) 50 MG TABLET    Take 1 tablet by mouth daily       ALLERGIES     Patient has no known allergies.     FAMILY HISTORY       Family History   Problem Relation Age of Onset    Other Mother         degenerative disc disease    Depression Mother     Other Father         car accident    Depression Maternal Aunt     Schizophrenia Maternal Aunt           SOCIAL HISTORY       Social History     Socioeconomic History    Marital status: Single     Spouse name: None    Number of children: None    Years of education: None    Highest education level: None   Tobacco Use    Smoking status: Every Day     Packs/day: 1.00     Years: 28.00     Pack years: 28.00     Types: Cigarettes    Smokeless tobacco: Never   Vaping Use    Vaping Use: Never used   Substance and Sexual Activity    Alcohol use: Not Currently     Comment: rarely now    Drug use: Never    Sexual activity: Not Currently     Social Determinants of Health     Financial Resource Strain: High Risk    Difficulty of Paying Living Expenses: Very hard   Food Insecurity: Food Insecurity Present    Worried About Running Out of Food in the Last Year: Sometimes true    Ran Out of Food in the Last Year: Sometimes true   Transportation Needs: No Transportation Needs    Lack within normal limits   URINALYSIS WITH REFLEX TO CULTURE - Abnormal; Notable for the following components:    Ketones, Urine TRACE (*)     Blood, Urine SMALL (*)     Protein, UA 30 (*)     All other components within normal limits   MICROSCOPIC URINALYSIS - Abnormal; Notable for the following components:    Mucus, UA 2+ (*)     RBC, UA 5-10 (*)     Epithelial Cells, UA 11-20 (*)     Bacteria, UA Rare (*)     Trichomonas, UA Present (*)     All other components within normal limits   PREGNANCY, URINE       All otherlabs were within normal range or not returned as of this dictation. EMERGENCY DEPARTMENT COURSE and DIFFERENTIAL DIAGNOSIS/MDM:   Vitals:    Vitals:    11/07/22 1000 11/07/22 1030   BP: (!) 196/117    Pulse: (!) 104 (!) 104   Resp: 16 18   Temp: 98.5 °F (36.9 °C)    TempSrc: Oral    SpO2: 96% 100%   Weight: 244 lb 11.2 oz (111 kg)    Height: 5' 2.5\" (1.588 m)          MDM  Patient is positive for influenza A which is most likely cause of her symptoms. Chest x-ray is unremarkable. Patient is mildly tachycardic however I feel this is due to influenza infection. No signs of hemodynamic instability or altered mental status. Patient does test positive for trichomonas but reports she has not been sexually active in the past 2 years. I explained her how she can be asymptomatic for a very long time with this. She is treated with 1 dose of Flagyl in the emergency department to treat trichomonas and I have recommended primary care follow-up for comprehensive STD testing. Patient also has significant hypertension but denies any chest pain or focal neurologic deficits. Also recommended close outpatient follow-up. Patient has not taken her blood pressure medicine today which I feel is contributing to her symptoms but does not need a refill report she has medication at home. Strict ER return precautions are given for any chest pain or focal neurologic deficits.     The patient has normal mental status, no acute respiratory distress, and I do not suspect impending acute hypoxic respiratory failure or CNS infection. I feel the patient is appropriate for trial of outpatient management with p.o. Tylenol, Tamiflu , and close outpatient follow-up. Strict ER return precautions are given. Do not suspect sepsis, meningitis, pneumonia or impending acute respiratory failure or hemodynamic instability in the rectal this is a reasonable disposition. The patient expresses understanding and agreement with this plan and is discharged home. Procedures    FINAL IMPRESSION      1. Influenza A    2. Trichomonosis          DISPOSITION/PLAN   DISPOSITION Decision To Discharge 11/07/2022 11:42:27 AM      PATIENT REFERRED TO:  Jerod Villanueav, DO  [address] [facility] 19  [phone]    In 1 week      [facility] Emergency Department  [address] [place] [address]  [phone]    If symptoms worsen      MEDICATIONS:  New Prescriptions    OSELTAMIVIR (TAMIFLU) 75 MG CAPSULE    Take 1 capsule by mouth 2 times daily for 5 days          (Please note that portions of this note were completed with a voice recognition program.  Efforts were made to edit the dictations but occasionally words aremis-transcribed. )    Humble Morocho MD (electronically signed)  Attending Emergency Physician            Humble Morocho MD  11/07/22 9330 n/a

## 2024-10-29 NOTE — ASU PREOP CHECKLIST - HEIGHT IN CM
177.8 Alert-The patient is alert, awake and responds to voice. The patient is oriented to time, place, and person. The triage nurse is able to obtain subjective information.

## 2024-10-29 NOTE — ASU DISCHARGE PLAN (ADULT/PEDIATRIC) - FINANCIAL ASSISTANCE
[facility] provides services at a reduced cost to those who are determined to be eligible through [facility]’s financial assistance program. Information regarding [facility]’s financial assistance program can be found by going to [email or web address] or by calling [phone].

## 2024-10-29 NOTE — ASU PATIENT PROFILE, ADULT - FALL HARM RISK - HARM RISK INTERVENTIONS

## 2024-10-29 NOTE — BRIEF OPERATIVE NOTE - NSICDXBRIEFPROCEDURE_GEN_ALL_CORE_FT
PROCEDURES:  Robot-assisted repair of right inguinal hernia using da Collins Xi 29-Oct-2024 18:44:20  Prerna Hull

## 2024-10-29 NOTE — ASU DISCHARGE PLAN (ADULT/PEDIATRIC) - ASU DC SPECIAL INSTRUCTIONSFT
Follow up with Dr. Baker in 1-2 weeks. Please call to schedule an appointment.   NOTIFY YOUR SURGEON IF: You have any bleeding that does not stop, any pus draining from your wound, any fever (over 100.4 F) or chills, persistent nausea/vomiting, persistent diarrhea, or if your pain is not controlled on your discharge pain medications.    Wound: You may take off outer pink dressing and shower after 48 hours. After showering, pat steri strips dry. Leave the white steri strips in place, they will fall off on their own in approximately 5-7 days or they will be removed at your follow up appointment.
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
## 2024-10-29 NOTE — ASU DISCHARGE PLAN (ADULT/PEDIATRIC) - CARE PROVIDER_API CALL
Ariel Baker  Surgery  [address] [facility]  [place] [address]  Phone: [phone]  Fax: [phone]  Follow Up Time: 2 weeks

## 2024-11-01 LAB — SURGICAL PATHOLOGY STUDY: SIGNIFICANT CHANGE UP

## 2024-11-04 DIAGNOSIS — K40.90 UNILATERAL INGUINAL HERNIA, WITHOUT OBSTRUCTION OR GANGRENE, NOT SPECIFIED AS RECURRENT: ICD-10-CM

## 2024-11-04 DIAGNOSIS — D17.6 BENIGN LIPOMATOUS NEOPLASM OF SPERMATIC CORD: ICD-10-CM

## 2024-11-04 DIAGNOSIS — I10 ESSENTIAL (PRIMARY) HYPERTENSION: ICD-10-CM

## 2024-11-13 ENCOUNTER — APPOINTMENT (OUTPATIENT)
Dept: SURGERY | Facility: CLINIC | Age: 69
End: 2024-11-13
Payer: COMMERCIAL

## 2024-11-13 ENCOUNTER — NON-APPOINTMENT (OUTPATIENT)
Age: 69
End: 2024-11-13

## 2024-11-13 VITALS
BODY MASS INDEX: 25.91 KG/M2 | SYSTOLIC BLOOD PRESSURE: 110 MMHG | DIASTOLIC BLOOD PRESSURE: 73 MMHG | WEIGHT: 181 LBS | OXYGEN SATURATION: 96 % | TEMPERATURE: 97.6 F | HEART RATE: 76 BPM | HEIGHT: 70 IN

## 2024-11-13 PROCEDURE — 99024 POSTOP FOLLOW-UP VISIT: CPT

## 2024-12-13 ENCOUNTER — APPOINTMENT (OUTPATIENT)
Dept: SURGERY | Facility: CLINIC | Age: 69
End: 2024-12-13
Payer: COMMERCIAL

## 2024-12-13 VITALS
OXYGEN SATURATION: 98 % | BODY MASS INDEX: 35.53 KG/M2 | HEIGHT: 60 IN | TEMPERATURE: 97.9 F | DIASTOLIC BLOOD PRESSURE: 90 MMHG | WEIGHT: 181 LBS | HEART RATE: 74 BPM | SYSTOLIC BLOOD PRESSURE: 136 MMHG

## 2024-12-13 VITALS — WEIGHT: 181 LBS | HEIGHT: 70 IN | BODY MASS INDEX: 25.91 KG/M2

## 2024-12-13 PROCEDURE — 99024 POSTOP FOLLOW-UP VISIT: CPT

## 2025-01-21 ENCOUNTER — OFFICE (OUTPATIENT)
Facility: LOCATION | Age: 70
Setting detail: OPHTHALMOLOGY
End: 2025-01-21
Payer: COMMERCIAL

## 2025-01-21 DIAGNOSIS — R51.9: ICD-10-CM

## 2025-01-21 DIAGNOSIS — H35.40: ICD-10-CM

## 2025-01-21 PROCEDURE — 92004 COMPRE OPH EXAM NEW PT 1/>: CPT | Performed by: OPHTHALMOLOGY

## 2025-01-21 ASSESSMENT — TONOMETRY
OS_IOP_MMHG: 13
OD_IOP_MMHG: 15

## 2025-01-21 ASSESSMENT — CONFRONTATIONAL VISUAL FIELD TEST (CVF)
OD_FINDINGS: FULL
OS_FINDINGS: FULL

## 2025-01-21 ASSESSMENT — VISUAL ACUITY
OS_BCVA: 20/25-2
OD_BCVA: 20/20-2

## 2025-04-05 ENCOUNTER — NON-APPOINTMENT (OUTPATIENT)
Age: 70
End: 2025-04-05

## 2025-04-07 ENCOUNTER — APPOINTMENT (OUTPATIENT)
Dept: PAIN MANAGEMENT | Facility: CLINIC | Age: 70
End: 2025-04-07

## 2025-06-06 ENCOUNTER — OUTPATIENT (OUTPATIENT)
Dept: OUTPATIENT SERVICES | Facility: HOSPITAL | Age: 70
LOS: 1 days | End: 2025-06-06
Payer: COMMERCIAL

## 2025-06-06 ENCOUNTER — APPOINTMENT (OUTPATIENT)
Dept: RADIOLOGY | Facility: CLINIC | Age: 70
End: 2025-06-06

## 2025-06-06 ENCOUNTER — APPOINTMENT (OUTPATIENT)
Dept: PAIN MANAGEMENT | Facility: CLINIC | Age: 70
End: 2025-06-06

## 2025-06-06 VITALS
HEIGHT: 70 IN | WEIGHT: 185 LBS | BODY MASS INDEX: 26.48 KG/M2 | DIASTOLIC BLOOD PRESSURE: 76 MMHG | SYSTOLIC BLOOD PRESSURE: 115 MMHG | HEART RATE: 62 BPM

## 2025-06-06 DIAGNOSIS — Z98.890 OTHER SPECIFIED POSTPROCEDURAL STATES: Chronic | ICD-10-CM

## 2025-06-06 DIAGNOSIS — G44.52 NEW DAILY PERSISTENT HEADACHE (NDPH): ICD-10-CM

## 2025-06-06 PROCEDURE — 99204 OFFICE O/P NEW MOD 45 MIN: CPT

## 2025-06-06 PROCEDURE — 72052 X-RAY EXAM NECK SPINE 6/>VWS: CPT

## 2025-06-06 PROCEDURE — 72052 X-RAY EXAM NECK SPINE 6/>VWS: CPT | Mod: 26

## 2025-06-06 RX ORDER — AMITRIPTYLINE HYDROCHLORIDE 10 MG/1
10 TABLET, FILM COATED ORAL
Qty: 30 | Refills: 2 | Status: ACTIVE | COMMUNITY
Start: 2025-06-06 | End: 1900-01-01

## 2025-06-06 RX ORDER — TOPIRAMATE 25 MG/1
25 TABLET, FILM COATED ORAL
Refills: 0 | Status: ACTIVE | COMMUNITY

## 2025-06-25 PROBLEM — M54.2 CERVICAL PAIN: Status: ACTIVE | Noted: 2025-06-25

## 2025-06-25 PROBLEM — M54.2 CERVICAL MUSCLE PAIN: Status: ACTIVE | Noted: 2025-06-25

## 2025-09-02 ENCOUNTER — APPOINTMENT (OUTPATIENT)
Dept: PAIN MANAGEMENT | Facility: CLINIC | Age: 70
End: 2025-09-02
Payer: COMMERCIAL

## 2025-09-02 VITALS
BODY MASS INDEX: 24.62 KG/M2 | DIASTOLIC BLOOD PRESSURE: 86 MMHG | HEIGHT: 70 IN | WEIGHT: 172 LBS | HEART RATE: 80 BPM | SYSTOLIC BLOOD PRESSURE: 128 MMHG

## 2025-09-02 PROCEDURE — 99213 OFFICE O/P EST LOW 20 MIN: CPT

## 2025-09-10 ENCOUNTER — RX RENEWAL (OUTPATIENT)
Age: 70
End: 2025-09-10

## (undated) DEVICE — CATH IV SAFE BC 22G X 1" (BLUE)

## (undated) DEVICE — XI DRAPE COLUMN

## (undated) DEVICE — SNARE EXACTO COLD 9MMX230CM

## (undated) DEVICE — CATH IV SAFE BC 20G X 1.16" (PINK)

## (undated) DEVICE — POSITIONER PURPLE ARM ONE STEP (LARGE)

## (undated) DEVICE — SUT VLOC 180 2-0 12" V-20 GREEN

## (undated) DEVICE — XI OBTURATOR OPTICAL BLADELESS 8MM

## (undated) DEVICE — NDL HYPO SAFE 22G X 1.5" (BLACK)

## (undated) DEVICE — DRAPE LIGHT HANDLE COVER (GREEN)

## (undated) DEVICE — POSITIONER PINK PAD PIGAZZI SYSTEM

## (undated) DEVICE — POSITIONER PATIENT SAFETY STRAP 3X60"

## (undated) DEVICE — XI ARM SCISSOR MONO CURVED

## (undated) DEVICE — SUT VLOC 180 2-0 6" GS-22 GREEN

## (undated) DEVICE — XI ARM NEEDLE DRIVER LARGE

## (undated) DEVICE — TUBING STRYKER PNEUMOCLEAR SMOKE EVACUATION HIGH FLOW

## (undated) DEVICE — PACK GENERAL LAPAROSCOPY

## (undated) DEVICE — TUBING SUCTION 20FT

## (undated) DEVICE — ELCTR STRYKER NEPTUNE SMOKE EVACUATION PENCIL (GREEN)

## (undated) DEVICE — DRSG STERISTRIPS 0.5 X 4"

## (undated) DEVICE — SUT VLOC 180 0 9" GS-21 GREEN

## (undated) DEVICE — SUT MONOCRYL 4-0 27" PS-2 UNDYED

## (undated) DEVICE — SOL INJ NS 0.9% 500ML 2 PORT

## (undated) DEVICE — Device

## (undated) DEVICE — SUT VLOC 180 2-0 9" GS-22 GREEN

## (undated) DEVICE — TUBING CAP SET ENDO 24HR USE GI

## (undated) DEVICE — CLAMP BX HOT RAD JAW 3

## (undated) DEVICE — SUT VICRYL 0 27" UR-6

## (undated) DEVICE — ENDOCATCH 10MM SPECIMEN POUCH

## (undated) DEVICE — ELCTR GROUNDING PAD ADULT COVIDIEN

## (undated) DEVICE — SHEARS COVIDIEN ENDO SHEAR 5MM X 31CM W UNIPOLAR CAUTERY

## (undated) DEVICE — POSITIONER FOAM HEAD DONUT 9" (PINK)

## (undated) DEVICE — XI SEAL UNIVERSIAL 5-12MM

## (undated) DEVICE — POSITIONER FOAM LAMINECTOMY ARM CRADLE (PINK)

## (undated) DEVICE — POLY TRAP ETRAP

## (undated) DEVICE — DRAPE 3/4 SHEET W REINFORCEMENT 56X77"

## (undated) DEVICE — TUBING IV SET GRAVITY 3Y 100" MACRO

## (undated) DEVICE — SENSOR O2 FINGER ADULT

## (undated) DEVICE — SUT VLOC 180 0 18" GS-21 GREEN

## (undated) DEVICE — D HELP - CLEARVIEW CLEARIFY SYSTEM

## (undated) DEVICE — DRAPE SPLIT SHEET 77" X 120"

## (undated) DEVICE — NDL HYPO SAFE 25G X 1.5" (ORANGE)

## (undated) DEVICE — IRRIGATOR BIO SHIELD

## (undated) DEVICE — XI DRAPE ARM

## (undated) DEVICE — PACK IV START WITH CHG

## (undated) DEVICE — BLADE SURGICAL #15 CARBON

## (undated) DEVICE — XI TIP COVER

## (undated) DEVICE — DRSG KERLIX ROLL 4.5"

## (undated) DEVICE — SUT VLOC 180 0 12" GS-21 GREEN

## (undated) DEVICE — GLV 7.5 PROTEXIS (WHITE)

## (undated) DEVICE — XI ARM FORCEP CADIERE 8MM

## (undated) DEVICE — FOLEY HOLDER STATLOCK 2 WAY ADULT

## (undated) DEVICE — XI ARM FORCEP FENESTRATED BIPOLAR 8MM

## (undated) DEVICE — GLV 7 PROTEXIS (WHITE)

## (undated) DEVICE — SUCTION YANKAUER NO CONTROL VENT

## (undated) DEVICE — TUBING SUCTION CONN 6FT STERILE